# Patient Record
Sex: FEMALE | Race: WHITE | NOT HISPANIC OR LATINO | URBAN - METROPOLITAN AREA
[De-identification: names, ages, dates, MRNs, and addresses within clinical notes are randomized per-mention and may not be internally consistent; named-entity substitution may affect disease eponyms.]

---

## 2018-09-24 ENCOUNTER — EMERGENCY (EMERGENCY)
Facility: HOSPITAL | Age: 28
LOS: 0 days | Discharge: HOME | End: 2018-09-24
Admitting: PHYSICIAN ASSISTANT

## 2018-09-24 VITALS — WEIGHT: 149.91 LBS | HEIGHT: 64 IN

## 2018-09-24 VITALS
OXYGEN SATURATION: 100 % | RESPIRATION RATE: 18 BRPM | TEMPERATURE: 97 F | HEART RATE: 96 BPM | SYSTOLIC BLOOD PRESSURE: 135 MMHG | DIASTOLIC BLOOD PRESSURE: 50 MMHG

## 2018-09-24 DIAGNOSIS — M79.673 PAIN IN UNSPECIFIED FOOT: ICD-10-CM

## 2018-09-24 DIAGNOSIS — M79.672 PAIN IN LEFT FOOT: ICD-10-CM

## 2018-09-24 DIAGNOSIS — J45.909 UNSPECIFIED ASTHMA, UNCOMPLICATED: ICD-10-CM

## 2018-09-24 DIAGNOSIS — M79.675 PAIN IN LEFT TOE(S): ICD-10-CM

## 2018-09-24 RX ORDER — IBUPROFEN 200 MG
600 TABLET ORAL ONCE
Qty: 0 | Refills: 0 | Status: COMPLETED | OUTPATIENT
Start: 2018-09-24 | End: 2018-09-24

## 2018-09-24 RX ADMIN — Medication 600 MILLIGRAM(S): at 23:35

## 2018-09-24 NOTE — ED PROVIDER NOTE - NS ED ROS FT
GEN: (-) fever, (-) chills  NEURO: (-) weakness, (-) paresthesias  MS:(+) Big toe pain, (-)myalgias, (-) swelling  SKIN: (-) rashes, (-) new lesions, (-) ecchymosis

## 2018-09-24 NOTE — ED PROVIDER NOTE - OBJECTIVE STATEMENT
The patient is a 28y Female presenting to the ED with left big toe pain x 2 days. Patient states she started developing sharp pain at the base of her big toe that is worse with walking or any type of pressure. Patient denies any trauma, swelling, fever/chills, or numbness/tingling. Patient has not taken any pain medications for this. She The patient is a 28y Female presenting to the ED with left big toe pain x 2 days. Patient states she started developing sharp pain at the base of her big toe that is worse with walking or any type of pressure. Patient denies any trauma, swelling, fever/chills, or numbness/tingling. Patient has not taken any pain medications for this.

## 2018-09-24 NOTE — ED PROVIDER NOTE - MEDICAL DECISION MAKING DETAILS
Patient here for left foot and left great toe pain, no known trauma or injury. xray unremarkable, will ace wrap, hard sole shoe and refer to podiatry

## 2018-09-24 NOTE — ED PROVIDER NOTE - PROGRESS NOTE DETAILS
FANY Noel: I was directly involved in the care and management of this patient while supervising PA Fellow

## 2019-07-18 ENCOUNTER — OUTPATIENT (OUTPATIENT)
Dept: OUTPATIENT SERVICES | Facility: HOSPITAL | Age: 29
LOS: 1 days | Discharge: HOME | End: 2019-07-18

## 2019-07-19 DIAGNOSIS — Z00.01 ENCOUNTER FOR GENERAL ADULT MEDICAL EXAMINATION WITH ABNORMAL FINDINGS: ICD-10-CM

## 2019-07-19 PROBLEM — J45.909 UNSPECIFIED ASTHMA, UNCOMPLICATED: Chronic | Status: ACTIVE | Noted: 2018-09-25

## 2019-07-24 ENCOUNTER — OUTPATIENT (OUTPATIENT)
Dept: OUTPATIENT SERVICES | Facility: HOSPITAL | Age: 29
LOS: 1 days | Discharge: HOME | End: 2019-07-24

## 2019-07-25 DIAGNOSIS — R79.9 ABNORMAL FINDING OF BLOOD CHEMISTRY, UNSPECIFIED: ICD-10-CM

## 2020-03-15 ENCOUNTER — EMERGENCY (EMERGENCY)
Facility: HOSPITAL | Age: 30
LOS: 0 days | Discharge: HOME | End: 2020-03-15
Attending: EMERGENCY MEDICINE | Admitting: EMERGENCY MEDICINE
Payer: MEDICAID

## 2020-03-15 VITALS
TEMPERATURE: 97 F | OXYGEN SATURATION: 98 % | DIASTOLIC BLOOD PRESSURE: 73 MMHG | SYSTOLIC BLOOD PRESSURE: 130 MMHG | HEART RATE: 86 BPM

## 2020-03-15 VITALS
TEMPERATURE: 97 F | HEART RATE: 85 BPM | RESPIRATION RATE: 16 BRPM | SYSTOLIC BLOOD PRESSURE: 131 MMHG | DIASTOLIC BLOOD PRESSURE: 58 MMHG | OXYGEN SATURATION: 100 %

## 2020-03-15 DIAGNOSIS — R11.2 NAUSEA WITH VOMITING, UNSPECIFIED: ICD-10-CM

## 2020-03-15 DIAGNOSIS — R11.10 VOMITING, UNSPECIFIED: ICD-10-CM

## 2020-03-15 LAB
ALBUMIN SERPL ELPH-MCNC: 4.4 G/DL — SIGNIFICANT CHANGE UP (ref 3.5–5.2)
ALP SERPL-CCNC: 99 U/L — SIGNIFICANT CHANGE UP (ref 30–115)
ALT FLD-CCNC: 14 U/L — SIGNIFICANT CHANGE UP (ref 0–41)
ANION GAP SERPL CALC-SCNC: 13 MMOL/L — SIGNIFICANT CHANGE UP (ref 7–14)
APPEARANCE UR: CLEAR — SIGNIFICANT CHANGE UP
AST SERPL-CCNC: 23 U/L — SIGNIFICANT CHANGE UP (ref 0–41)
BACTERIA # UR AUTO: ABNORMAL
BASOPHILS # BLD AUTO: 0.04 K/UL — SIGNIFICANT CHANGE UP (ref 0–0.2)
BASOPHILS NFR BLD AUTO: 0.3 % — SIGNIFICANT CHANGE UP (ref 0–1)
BILIRUB DIRECT SERPL-MCNC: <0.2 MG/DL — SIGNIFICANT CHANGE UP (ref 0–0.2)
BILIRUB INDIRECT FLD-MCNC: >0 MG/DL — LOW (ref 0.2–1.2)
BILIRUB SERPL-MCNC: 0.2 MG/DL — SIGNIFICANT CHANGE UP (ref 0.2–1.2)
BILIRUB UR-MCNC: NEGATIVE — SIGNIFICANT CHANGE UP
BUN SERPL-MCNC: 9 MG/DL — LOW (ref 10–20)
CALCIUM SERPL-MCNC: 9.2 MG/DL — SIGNIFICANT CHANGE UP (ref 8.5–10.1)
CHLORIDE SERPL-SCNC: 105 MMOL/L — SIGNIFICANT CHANGE UP (ref 98–110)
CO2 SERPL-SCNC: 21 MMOL/L — SIGNIFICANT CHANGE UP (ref 17–32)
COLOR SPEC: YELLOW — SIGNIFICANT CHANGE UP
CREAT SERPL-MCNC: 0.8 MG/DL — SIGNIFICANT CHANGE UP (ref 0.7–1.5)
DIFF PNL FLD: SIGNIFICANT CHANGE UP
EOSINOPHIL # BLD AUTO: 0.63 K/UL — SIGNIFICANT CHANGE UP (ref 0–0.7)
EOSINOPHIL NFR BLD AUTO: 5.4 % — SIGNIFICANT CHANGE UP (ref 0–8)
EPI CELLS # UR: 8 /HPF — HIGH (ref 0–5)
GLUCOSE SERPL-MCNC: 117 MG/DL — HIGH (ref 70–99)
GLUCOSE UR QL: NEGATIVE — SIGNIFICANT CHANGE UP
HCG SERPL QL: NEGATIVE — SIGNIFICANT CHANGE UP
HCT VFR BLD CALC: 42.5 % — SIGNIFICANT CHANGE UP (ref 37–47)
HGB BLD-MCNC: 13.7 G/DL — SIGNIFICANT CHANGE UP (ref 12–16)
HYALINE CASTS # UR AUTO: 1 /LPF — SIGNIFICANT CHANGE UP (ref 0–7)
IMM GRANULOCYTES NFR BLD AUTO: 0.4 % — HIGH (ref 0.1–0.3)
KETONES UR-MCNC: NEGATIVE — SIGNIFICANT CHANGE UP
LACTATE SERPL-SCNC: 1.6 MMOL/L — SIGNIFICANT CHANGE UP (ref 0.7–2)
LEUKOCYTE ESTERASE UR-ACNC: ABNORMAL
LIDOCAIN IGE QN: 21 U/L — SIGNIFICANT CHANGE UP (ref 7–60)
LYMPHOCYTES # BLD AUTO: 1.83 K/UL — SIGNIFICANT CHANGE UP (ref 1.2–3.4)
LYMPHOCYTES # BLD AUTO: 15.8 % — LOW (ref 20.5–51.1)
MCHC RBC-ENTMCNC: 27.7 PG — SIGNIFICANT CHANGE UP (ref 27–31)
MCHC RBC-ENTMCNC: 32.2 G/DL — SIGNIFICANT CHANGE UP (ref 32–37)
MCV RBC AUTO: 86 FL — SIGNIFICANT CHANGE UP (ref 81–99)
MONOCYTES # BLD AUTO: 0.63 K/UL — HIGH (ref 0.1–0.6)
MONOCYTES NFR BLD AUTO: 5.4 % — SIGNIFICANT CHANGE UP (ref 1.7–9.3)
NEUTROPHILS # BLD AUTO: 8.43 K/UL — HIGH (ref 1.4–6.5)
NEUTROPHILS NFR BLD AUTO: 72.7 % — SIGNIFICANT CHANGE UP (ref 42.2–75.2)
NITRITE UR-MCNC: NEGATIVE — SIGNIFICANT CHANGE UP
NRBC # BLD: 0 /100 WBCS — SIGNIFICANT CHANGE UP (ref 0–0)
PH UR: 6 — SIGNIFICANT CHANGE UP (ref 5–8)
PLATELET # BLD AUTO: 332 K/UL — SIGNIFICANT CHANGE UP (ref 130–400)
POTASSIUM SERPL-MCNC: 4.6 MMOL/L — SIGNIFICANT CHANGE UP (ref 3.5–5)
POTASSIUM SERPL-SCNC: 4.6 MMOL/L — SIGNIFICANT CHANGE UP (ref 3.5–5)
PROT SERPL-MCNC: 7.3 G/DL — SIGNIFICANT CHANGE UP (ref 6–8)
PROT UR-MCNC: SIGNIFICANT CHANGE UP
RBC # BLD: 4.94 M/UL — SIGNIFICANT CHANGE UP (ref 4.2–5.4)
RBC # FLD: 13.3 % — SIGNIFICANT CHANGE UP (ref 11.5–14.5)
RBC CASTS # UR COMP ASSIST: 5 /HPF — HIGH (ref 0–4)
SODIUM SERPL-SCNC: 139 MMOL/L — SIGNIFICANT CHANGE UP (ref 135–146)
SP GR SPEC: 1.03 — HIGH (ref 1.01–1.02)
TROPONIN T SERPL-MCNC: <0.01 NG/ML — SIGNIFICANT CHANGE UP
UROBILINOGEN FLD QL: SIGNIFICANT CHANGE UP
WBC # BLD: 11.61 K/UL — HIGH (ref 4.8–10.8)
WBC # FLD AUTO: 11.61 K/UL — HIGH (ref 4.8–10.8)
WBC UR QL: 8 /HPF — HIGH (ref 0–5)

## 2020-03-15 PROCEDURE — 71045 X-RAY EXAM CHEST 1 VIEW: CPT | Mod: 26

## 2020-03-15 PROCEDURE — 93010 ELECTROCARDIOGRAM REPORT: CPT

## 2020-03-15 PROCEDURE — 99285 EMERGENCY DEPT VISIT HI MDM: CPT

## 2020-03-15 RX ORDER — SODIUM CHLORIDE 9 MG/ML
1000 INJECTION, SOLUTION INTRAVENOUS ONCE
Refills: 0 | Status: COMPLETED | OUTPATIENT
Start: 2020-03-15 | End: 2020-03-15

## 2020-03-15 RX ORDER — SODIUM CHLORIDE 9 MG/ML
1000 INJECTION INTRAMUSCULAR; INTRAVENOUS; SUBCUTANEOUS ONCE
Refills: 0 | Status: COMPLETED | OUTPATIENT
Start: 2020-03-15 | End: 2020-03-15

## 2020-03-15 RX ORDER — METOCLOPRAMIDE HCL 10 MG
10 TABLET ORAL ONCE
Refills: 0 | Status: COMPLETED | OUTPATIENT
Start: 2020-03-15 | End: 2020-03-15

## 2020-03-15 RX ADMIN — Medication 10 MILLIGRAM(S): at 06:05

## 2020-03-15 RX ADMIN — SODIUM CHLORIDE 1000 MILLILITER(S): 9 INJECTION, SOLUTION INTRAVENOUS at 07:25

## 2020-03-15 RX ADMIN — SODIUM CHLORIDE 1000 MILLILITER(S): 9 INJECTION INTRAMUSCULAR; INTRAVENOUS; SUBCUTANEOUS at 06:05

## 2020-03-15 NOTE — ED PROVIDER NOTE - CARE PLAN
Assessment and plan of treatment:	Plan: EKG, labs, ivf, u preg, urine, anti-emetic, CXR, reassess. Principal Discharge DX:	Vomiting  Assessment and plan of treatment:	Plan: EKG, labs, ivf, u preg, urine, anti-emetic, CXR, reassess.

## 2020-03-15 NOTE — ED PROVIDER NOTE - PROGRESS NOTE DETAILS
TC: Pt signed out to me by Dr. Bradley. 28 yo F with no PMHx who presents with nausea. No abd pain, dysuria, urinary frequency/urgency, hematuria. Given IVF, reglan. Labs wnl, serum preg negative. Pt tolerated po in ED. Does not want to wait for urine results and has no urinary sx. States she will f/u with PMD. Strict ED return precautions given. Pt verbalized understanding and was agreeable with plan. ADDENDUM by Brittany Landaverde M.D.: I received sign-off from Dr. PARTHA Zimmerman. 29yoF prev healthy p/w NBNB n/v since 330am likely 2/2 pizza, I was to reassess. At this time pt stating she feels all better and would like to leave, confirms hx, denies abd pain, BM difficulty, urinary symptoms. Labs unremarkable. NAD, well appearing, abd soft NT ND nml BS. Well hydrated. Patient is well appearing, NAD, afebrile, hemodynamically stable. Discharged with instructions in further symptomatic care, return precautions, and need for PMD f/u.

## 2020-03-15 NOTE — ED PROVIDER NOTE - NS ED ROS FT
Review of Systems:  •	CONSTITUTIONAL - No fever, No diaphoresis, No weight change  •	SKIN - No rash  •	HEMATOLOGIC - No abnormal bleeding or bruising  •	EYES - No eye pain, No blurred vision  •	ENT - No change in hearing, No sore throat, No neck pain, No rhinorrhea, No ear pain  •	RESPIRATORY - No shortness of breath, No cough  •	CARDIAC -No chest pain, No palpitations  •	GI - No abdominal pain, + nausea, + vomiting, No diarrhea, No constipation, No bright red blood per rectum or melena. No flank pain  •             - No dysuria, frequency, hematuria.   •	ENDO - No polydypsia, No polyuria, No heat/cold intolerance  •	MUSCULOSKELETAL - No joint paint, No swelling, No back pain  •	NEUROLOGIC - No numbness, No focal weakness, No headache, No dizziness  All other systems negative, unless specified in HPI

## 2020-03-15 NOTE — ED ADULT NURSE NOTE - NS_SISCREENINGSR_GEN_ALL_ED
Prior Authorization Retail Medication Request    Medication/Dose: Fish Oil  ICD code (if different than what is on RX):  See chart  Previously Tried and Failed:  See chart  Rationale:  See chart    Insurance Name:  See chart  Insurance ID:  [id number]      Pharmacy Information (if different than what is on RX)  Name: [facility]  Phone:  [phone]   Negative

## 2020-03-15 NOTE — ED ADULT NURSE NOTE - OBJECTIVE STATEMENT
Pt awake and alert, calm and comfortable. Pt presents with vomiting pink tinged sputum since yesterday x 5 episodes, Denies any fevers or dysuria. Pt reports having olives an pizza today. Pt afebrile 97F oral. Pt denies any dizziness/ lightheadedness, diarrhea or abdominal pain. HR 86 and regular. Abdominal nontender, and soft.

## 2020-03-15 NOTE — ED PROVIDER NOTE - PATIENT PORTAL LINK FT
You can access the FollowMyHealth Patient Portal offered by [facility] by registering at the following website: [email or web address]. By joining [employer]’s FollowMyHealth portal, you will also be able to view your health information using other applications (apps) compatible with our system.

## 2020-03-15 NOTE — ED PROVIDER NOTE - ATTENDING CONTRIBUTION TO CARE
28 y/o f/ w pmhx of asthma, lmp last week, presents with vomiting since 3:30 PM, nbnb, ~ 5 episodes, ate olives from a store, and reports pizza made at home, so vomit with pink form the pizza sauce. No fever, chills,  cp,  pleuritic cp, sob, palpitations, diaphoresis, cough, ha/lh/dizziness, numbness/tingling, neck pain/ stiffness, abd pain, diarrhea, constipation, melena/brbpr, urinary symptoms, trauma, weakness, edema, calf pain/swelling/erythema, sick contacts, recent travel or rash. no drug use, no etoh abuse, not a smoker.     Vital Signs: I have reviewed the initial vital signs. Constitutional: WDWN in nad. Sitting on stretcher speaking full sentences. Integumentary: No rash. ENT: MMM NECK: Supple, non-tender, no meningeal signs. Cardiovascular: RRR, radial pulses 2/4 b/l. No JVD. Respiratory: BS present b/l, ctabl, no wheezing or crackles, no accessory muscle use, no stridor. Gastrointestinal: BS present throughout all 4 quadrants, soft, nd, nt, no rebound tenderness or guarding, no cvat. Musculoskeletal: FROM, no edema, no calf pain/swelling/erythema. Neurologic: AAOx3, motor 5/5 and sensation intact throughout upper and lower ext, CN II-XII intact, No facial droop or slurring of speech. No focal deficits.

## 2020-03-15 NOTE — ED ADULT TRIAGE NOTE - CHIEF COMPLAINT QUOTE
Pt reports nausea and vomiting tonight with x5 episodes with light pink color. Denies fever, abd pain, chest pain, sob, and diarrhea.

## 2020-03-15 NOTE — ED PROVIDER NOTE - OBJECTIVE STATEMENT
28 y/o female with no PMH who presents to ED for nausea and vomiting. Pt states she had onset of NBNB vomiting this evening. No abdominal pain, no diarrhea, constipation, fever, chills, ill contacts. No hx of abd surgery.

## 2020-03-15 NOTE — ED PROVIDER NOTE - CLINICAL SUMMARY MEDICAL DECISION MAKING FREE TEXT BOX
Abd benign. Well hydrated after fluids. Patient is well appearing, NAD, afebrile, hemodynamically stable. Discharged with instructions in further symptomatic care, return precautions, and need for PMD f/u.

## 2020-03-15 NOTE — ED ADULT NURSE REASSESSMENT NOTE - NS ED NURSE REASSESS COMMENT FT1
pt assessed A&ox4, VSS, pt states she feels much better denies any nausea or vomiting since arriving MD sheehan aware. safety and comfort measures maintained. will continue to monitor

## 2020-03-16 LAB
CULTURE RESULTS: SIGNIFICANT CHANGE UP
SPECIMEN SOURCE: SIGNIFICANT CHANGE UP

## 2020-09-03 NOTE — ED ADULT NURSE NOTE - GENITOURINARY ASSESSMENT
well developed, well nourished , in no acute distress , ambulating without difficulty , normal communication ability
- - -

## 2021-09-23 ENCOUNTER — EMERGENCY (EMERGENCY)
Facility: HOSPITAL | Age: 31
LOS: 0 days | Discharge: HOME | End: 2021-09-23
Attending: EMERGENCY MEDICINE | Admitting: EMERGENCY MEDICINE
Payer: MEDICAID

## 2021-09-23 VITALS
HEART RATE: 113 BPM | SYSTOLIC BLOOD PRESSURE: 131 MMHG | HEIGHT: 64 IN | OXYGEN SATURATION: 97 % | WEIGHT: 199.96 LBS | TEMPERATURE: 99 F | DIASTOLIC BLOOD PRESSURE: 78 MMHG | RESPIRATION RATE: 22 BRPM

## 2021-09-23 VITALS — HEART RATE: 95 BPM

## 2021-09-23 DIAGNOSIS — Z76.0 ENCOUNTER FOR ISSUE OF REPEAT PRESCRIPTION: ICD-10-CM

## 2021-09-23 DIAGNOSIS — J45.901 UNSPECIFIED ASTHMA WITH (ACUTE) EXACERBATION: ICD-10-CM

## 2021-09-23 PROCEDURE — 99284 EMERGENCY DEPT VISIT MOD MDM: CPT

## 2021-09-23 RX ORDER — IPRATROPIUM/ALBUTEROL SULFATE 18-103MCG
3 AEROSOL WITH ADAPTER (GRAM) INHALATION ONCE
Refills: 0 | Status: COMPLETED | OUTPATIENT
Start: 2021-09-23 | End: 2021-09-23

## 2021-09-23 RX ORDER — ALBUTEROL 90 UG/1
1 AEROSOL, METERED ORAL ONCE
Refills: 0 | Status: COMPLETED | OUTPATIENT
Start: 2021-09-23 | End: 2021-09-23

## 2021-09-23 RX ADMIN — Medication 3 MILLILITER(S): at 19:15

## 2021-09-23 RX ADMIN — Medication 60 MILLIGRAM(S): at 19:47

## 2021-09-23 RX ADMIN — ALBUTEROL 1 PUFF(S): 90 AEROSOL, METERED ORAL at 19:48

## 2021-09-23 NOTE — ED PROVIDER NOTE - PHYSICAL EXAMINATION
VITAL SIGNS: I have reviewed nursing notes and confirm.  CONSTITUTIONAL: calm female in stretcher, non-toxic, NAD  SKIN: Warm dry, normal skin turgor  HEAD: NCAT  EYES: EOMI, no scleral icterus  ENT: Moist mucous membranes, normal pharynx with no erythema or exudates  NECK: Supple; non tender. Full ROM.   CARD: RRR, no murmurs, rubs or gallops  RESP: mild bilateral wheezing.  ABD: soft, non-tender, non-distended, no rebound or guarding.   EXT: Full ROM, no bony tenderness, no pedal edema, no calf tenderness  NEURO: alert and oriented x 3, normal motor/sensory.  PSYCH: Cooperative, appropriate.

## 2021-09-23 NOTE — ED PROVIDER NOTE - PROGRESS NOTE DETAILS
Raz: pt initial peak flow 280 Raz: pt currently on third duoneb treatment, reports shortness of breath improved. lungs clear bilaterally. Raz: Pt given albuterol inhaler in ED. States she will call her PMD tomorrow morning for rx for machine. Feels comfortable for dc. Full DC instructions discussed and patient knows when to seek immediate medical attention.  Patient has proper follow up. All questions and concerns from patient addressed. Understanding of instructions verbalized.

## 2021-09-23 NOTE — ED ADULT NURSE NOTE - OBJECTIVE STATEMENT
30 y/o female pt came c/o asthma exacerbation, stated she ran out of her nebulizers and albuterol inhaler is not working. Pt is A&O x 4, denies any pain, b/l wheezes and diminished, received few duo nebs from EMS with slight improvement, denies cough, fever or body aches.

## 2021-09-23 NOTE — ED ADULT NURSE NOTE - NSIMPLEMENTINTERV_GEN_ALL_ED
Implemented All Universal Safety Interventions:  [place] to call system. Call bell, personal items and telephone within reach. Instruct patient to call for assistance. Room bathroom lighting operational. Non-slip footwear when patient is off stretcher. Physically safe environment: no spills, clutter or unnecessary equipment. Stretcher in lowest position, wheels locked, appropriate side rails in place.

## 2021-09-23 NOTE — ED ADULT TRIAGE NOTE - CHIEF COMPLAINT QUOTE
BIBA from home for asthma exacerbation   pt did not have any more nebulizer tx at home  EMS gave 3 duonebs en route  pt with b/l expiratory wheezes

## 2021-09-23 NOTE — ED PROVIDER NOTE - NS ED ROS FT
Constitutional: (-) diaphoresis  Eyes/ENT: (-) runny nose  Cardiovascular: (-) chest pain  Respiratory: see HPI  Gastrointestinal: (-) vomiting, (-) diarrhea  : (-) dysuria   Musculoskeletal: (-) joint pain  Integumentary: (-) rash  Neurological: (-) LOC  Allergic/Immunologic: (-) pruritus

## 2021-09-23 NOTE — ED PROVIDER NOTE - NSFOLLOWUPINSTRUCTIONS_ED_ALL_ED_FT
Call your primary care doctor tomorrow to request a refill for your asthma medications. Follow up with your primary care doctor.    Asthma    Asthma is a condition in which the airways tighten and narrow, making it difficult to breath. Asthma episodes, also called asthma attacks, range from minor to life-threatening. Symptoms include wheezing, coughing, chest tightness, or shortness of breath. The diagnosis of asthma is made by a review of your medical history and a physical exam, but may involve additional testing. Asthma cannot be cured, but medicines and lifestyle changes can help control it. Avoid triggers of asthma which may include animal dander, pollen, mold, smoke, air pollutants, etc.     SEEK IMMEDIATE MEDICAL CARE IF YOU HAVE ANY OF THE FOLLOWING SYMPTOMS: worsening of symptoms, shortness of breath at rest, chest pain, bluish discoloration to lips or fingertips, lightheadedness/dizziness, or fever. Take Prednisone 40mg (2 tablets) once a day for 4 days. This prescription has been sent to your pharmacy [facility] on [facility].  Call your primary care doctor tomorrow to request a refill for your asthma medications.  Follow up with your primary care doctor.    Asthma    Asthma is a condition in which the airways tighten and narrow, making it difficult to breath. Asthma episodes, also called asthma attacks, range from minor to life-threatening. Symptoms include wheezing, coughing, chest tightness, or shortness of breath. The diagnosis of asthma is made by a review of your medical history and a physical exam, but may involve additional testing. Asthma cannot be cured, but medicines and lifestyle changes can help control it. Avoid triggers of asthma which may include animal dander, pollen, mold, smoke, air pollutants, etc.     SEEK IMMEDIATE MEDICAL CARE IF YOU HAVE ANY OF THE FOLLOWING SYMPTOMS: worsening of symptoms, shortness of breath at rest, chest pain, bluish discoloration to lips or fingertips, lightheadedness/dizziness, or fever.

## 2021-09-23 NOTE — ED PROVIDER NOTE - PATIENT PORTAL LINK FT
You can access the FollowMyHealth Patient Portal offered by [facility] by registering at the following website: [email or web address]. By joining [employer]’s FollowMyHealth portal, you will also be able to view your health information using other applications (apps) compatible with our system.

## 2021-09-23 NOTE — ED PROVIDER NOTE - ATTENDING CONTRIBUTION TO CARE
31 F hx of mild intermittent asthma, never admitted. now machine stopped working. had asthma exacerbation, weather related. no cough. no fever. + immunized.  vss, nontoxic, well appearing, pink conj, anicteric, MMM, no exudates, neck supple, + wheezing mild end exp, RRR, equal radial pulses bilat, abd soft/nt/nd, no cva tend. no calves tend, no edema, no fnd. no rashes.

## 2021-09-23 NOTE — ED PROVIDER NOTE - OBJECTIVE STATEMENT
31F PMHx asthma (no hx hospital admissions) presents for asthma exacerbations. Reports her home machine stopped working today so she was unable to use her home meds. Uses singulair and albuterol at home. Denies fever/chills, cough, chest pain, abd pain, n/v/d. Vaccinated for COVID. No sick contacts. Came to ED requesting med/machine refill.

## 2022-01-13 ENCOUNTER — EMERGENCY (EMERGENCY)
Facility: HOSPITAL | Age: 32
LOS: 0 days | Discharge: HOME | End: 2022-01-13
Attending: EMERGENCY MEDICINE | Admitting: EMERGENCY MEDICINE
Payer: MEDICAID

## 2022-01-13 VITALS
OXYGEN SATURATION: 94 % | DIASTOLIC BLOOD PRESSURE: 74 MMHG | RESPIRATION RATE: 18 BRPM | SYSTOLIC BLOOD PRESSURE: 110 MMHG | TEMPERATURE: 98 F | WEIGHT: 190.04 LBS | HEIGHT: 64 IN | HEART RATE: 108 BPM

## 2022-01-13 DIAGNOSIS — J45.901 UNSPECIFIED ASTHMA WITH (ACUTE) EXACERBATION: ICD-10-CM

## 2022-01-13 PROCEDURE — 99284 EMERGENCY DEPT VISIT MOD MDM: CPT

## 2022-01-13 RX ORDER — DEXAMETHASONE 0.5 MG/5ML
10 ELIXIR ORAL ONCE
Refills: 0 | Status: COMPLETED | OUTPATIENT
Start: 2022-01-13 | End: 2022-01-13

## 2022-01-13 RX ORDER — ALBUTEROL 90 UG/1
3 AEROSOL, METERED ORAL
Qty: 60 | Refills: 0
Start: 2022-01-13 | End: 2022-02-23

## 2022-01-13 RX ORDER — DEXAMETHASONE 0.5 MG/5ML
10 ELIXIR ORAL ONCE
Refills: 0 | Status: DISCONTINUED | OUTPATIENT
Start: 2022-01-13 | End: 2022-01-13

## 2022-01-13 RX ORDER — IPRATROPIUM/ALBUTEROL SULFATE 18-103MCG
3 AEROSOL WITH ADAPTER (GRAM) INHALATION
Refills: 0 | Status: DISCONTINUED | OUTPATIENT
Start: 2022-01-13 | End: 2022-01-13

## 2022-01-13 RX ORDER — ALBUTEROL 90 UG/1
3 AEROSOL, METERED ORAL
Qty: 60 | Refills: 0
Start: 2022-01-13 | End: 2022-01-17

## 2022-01-13 RX ORDER — IPRATROPIUM/ALBUTEROL SULFATE 18-103MCG
3 AEROSOL WITH ADAPTER (GRAM) INHALATION ONCE
Refills: 0 | Status: COMPLETED | OUTPATIENT
Start: 2022-01-13 | End: 2022-01-13

## 2022-01-13 RX ADMIN — Medication 3 MILLILITER(S): at 02:32

## 2022-01-13 RX ADMIN — Medication 3 MILLILITER(S): at 02:33

## 2022-01-13 RX ADMIN — Medication 10 MILLIGRAM(S): at 02:32

## 2022-01-13 NOTE — ED PROVIDER NOTE - PROGRESS NOTE DETAILS
Raz: lungs ctab s/p 3 duonebs.  pt states she will f/u with her pulmonologist.  Full DC instructions discussed and patient knows when to seek immediate medical attention.  Patient has proper follow up.  All results discussed and patient aware they may require further work up.  Proper follow up ensured. Limitations of ED work up discussed.  Medications administered and prescribed/OTC home meds discussed.  All questions and concerns from patient or family addressed. Understanding of instructions verbalized.

## 2022-01-13 NOTE — ED ADULT NURSE NOTE - CHIEF COMPLAINT QUOTE
BIBA from home with complaints of asthma exacerbation. Patient states she took home medications without improvement and EMs gave patient neb treatment with slight improvement.

## 2022-01-13 NOTE — ED PROVIDER NOTE - OBJECTIVE STATEMENT
31F PMHx asthma (never been admitted/intubated) presents for asthma exacerbation. Pt used her albuterol inhaler twice pta and received 1 treatment with EMS with some relief. No fever/chills, cough, congestion, rhinorrhea, chest pain, abd pain, n/v/d.

## 2022-01-13 NOTE — ED PROVIDER NOTE - CARE PROVIDER_API CALL
Trenton Cadena)  Critical Care Medicine; Pulmonary Disease; Sleep Medicine  [address] [place] [address]  Phone: [phone]  Fax: [phone]  Follow Up Time:

## 2022-01-13 NOTE — ED PROVIDER NOTE - NSFOLLOWUPINSTRUCTIONS_ED_ALL_ED_FT
FOLLOW UP WITH A PULMONOLOGIST AND YOUR PRIMARY CARE DOCTOR.  A PRESCRIPTION FOR ALBUTEROL WAS SENT TO YOUR PHARMACY.    Asthma    Asthma is a condition in which the airways tighten and narrow, making it difficult to breath. Asthma episodes, also called asthma attacks, range from minor to life-threatening. Symptoms include wheezing, coughing, chest tightness, or shortness of breath. The diagnosis of asthma is made by a review of your medical history and a physical exam, but may involve additional testing. Asthma cannot be cured, but medicines and lifestyle changes can help control it. Avoid triggers of asthma which may include animal dander, pollen, mold, smoke, air pollutants, etc.     SEEK IMMEDIATE MEDICAL CARE IF YOU HAVE ANY OF THE FOLLOWING SYMPTOMS: worsening of symptoms, shortness of breath at rest, chest pain, bluish discoloration to lips or fingertips, lightheadedness/dizziness, or fever.

## 2022-01-13 NOTE — ED PROVIDER NOTE - PHYSICAL EXAMINATION
VITAL SIGNS: I have reviewed nursing notes and confirm.  CONSTITUTIONAL: well-appearing female, non-toxic, NAD  SKIN: Warm dry, normal skin turgor  HEAD: NCAT  EYES: EOMI, no scleral icterus  ENT: Moist mucous membranes, normal pharynx with no erythema or exudates  NECK: Supple; non tender. Full ROM.   CARD: RRR, no murmurs, rubs or gallops  RESP: +bl wheezing L >R. not tachypneic, speaking in full sentences.  ABD: soft, non-tender, non-distended, no rebound or guarding.   EXT: Full ROM, no bony tenderness, no pedal edema, no calf tenderness  NEURO: Normal gait.  PSYCH: Cooperative, appropriate.  \

## 2022-01-13 NOTE — ED ADULT TRIAGE NOTE - CHIEF COMPLAINT QUOTE
Problem: Chemotherapy Effects (Adult)  Goal: Signs and Symptoms of Listed Potential Problems Will be Absent, Minimized or Managed (Chemotherapy Effects)  Signs and symptoms of listed potential problems will be absent, minimized or managed by discharge/transition of care (reference Chemotherapy Effects (Adult) CPG).   Outcome: Ongoing (interventions implemented as appropriate)  Pt here for Keytruda infusion, accompanied by spouse, no new complaints or concerns at present, reports tolerating treatment well; discussed treatment plan for today, all questions answered and pt agrees to proceed       BIBA from home with complaints of asthma exacerbation. Patient states she took home medications without improvement and EMs gave patient neb treatment with slight improvement.

## 2022-01-13 NOTE — ED PROVIDER NOTE - PATIENT PORTAL LINK FT
You can access the FollowMyHealth Patient Portal offered by [facility] by registering at the following website: [email or web address]. By joining [employer]’s FollowMyHealth portal, you will also be able to view your health information using other applications (apps) compatible with our system.

## 2022-01-13 NOTE — ED PROVIDER NOTE - ATTENDING CONTRIBUTION TO CARE
I personally evaluated the patient. I reviewed the Resident’s or Physician Assistant’s note (as assigned above), and agree with the findings and plan except as documented in my note.  Pt with hx of asthma, no hx of intubations, presents with 2 days of chest tightness, wheezing and SOB for the past 2 days. Denies fever ,no sick contacts. VS reviewed, pt non-toxic appearing, NAD. Head ncat, MMM, neck supple, normal ROM, normal s1s2 without any murmurs, Lungs with b/l expiratory wheezes, abd +BS, s/nd/nt, extremities wnl, neuro exam grossly normal. No acute skin rashes. Plan is meds and reassess.

## 2022-01-31 NOTE — ED PROVIDER NOTE - ATTENDING WITH...
Leelee Mcclain DO      Electronically signed by Leelee Mcclain DO on 1/31/22 at 8:51 AM EST Resident

## 2022-02-19 ENCOUNTER — EMERGENCY (EMERGENCY)
Facility: HOSPITAL | Age: 32
LOS: 0 days | Discharge: HOME | End: 2022-02-19
Attending: EMERGENCY MEDICINE | Admitting: EMERGENCY MEDICINE
Payer: MEDICAID

## 2022-02-19 VITALS
WEIGHT: 179.9 LBS | TEMPERATURE: 98 F | DIASTOLIC BLOOD PRESSURE: 75 MMHG | RESPIRATION RATE: 20 BRPM | HEART RATE: 103 BPM | SYSTOLIC BLOOD PRESSURE: 123 MMHG | OXYGEN SATURATION: 94 % | HEIGHT: 64 IN

## 2022-02-19 DIAGNOSIS — R06.2 WHEEZING: ICD-10-CM

## 2022-02-19 DIAGNOSIS — J45.901 UNSPECIFIED ASTHMA WITH (ACUTE) EXACERBATION: ICD-10-CM

## 2022-02-19 PROCEDURE — 99284 EMERGENCY DEPT VISIT MOD MDM: CPT

## 2022-02-19 RX ORDER — IPRATROPIUM/ALBUTEROL SULFATE 18-103MCG
3 AEROSOL WITH ADAPTER (GRAM) INHALATION ONCE
Refills: 0 | Status: COMPLETED | OUTPATIENT
Start: 2022-02-19 | End: 2022-02-19

## 2022-02-19 RX ORDER — ALBUTEROL 90 UG/1
2 AEROSOL, METERED ORAL
Qty: 1 | Refills: 0
Start: 2022-02-19 | End: 2022-02-21

## 2022-02-19 RX ADMIN — Medication 50 MILLIGRAM(S): at 02:50

## 2022-02-19 RX ADMIN — Medication 3 MILLILITER(S): at 02:50

## 2022-02-19 NOTE — ED PROVIDER NOTE - ATTENDING CONTRIBUTION TO CARE
I personally evaluated the patient. I reviewed the Resident’s or Physician Assistant’s note (as assigned above), and agree with the findings and plan except as documented in my note.  31-year-old female with past medical history of asthma, non-smoker, no history of intubations no recent admissions presents with complaints of several hours of shortness of breath with associated wheezing.  Denies coughing, fever, chest pain, lightheadedness, LOC.  VSS, non toxic appearing, NAD, Head NCAT, MMM, neck supple, normal ROM, normal s1s2, lungs with mild expiratory wheezes, abd s/nt/nd, no guarding or rebound, extremities wnl, AAO x 3, GCS 15, neuro grossly normal. No acute skin lesions. Plan is nebs, steroids, and reassess.

## 2022-02-19 NOTE — ED PROVIDER NOTE - NSFOLLOWUPINSTRUCTIONS_ED_ALL_ED_FT
Asthma, Adult  Asthma is a long-term (chronic) condition that causes recurrent episodes in which the airways become tight and narrow. The airways are the passages that lead from the nose and mouth down into the lungs. Asthma episodes, also called asthma attacks, can cause coughing, wheezing, shortness of breath, and chest pain. The airways can also fill with mucus. During an attack, it can be difficult to breathe. Asthma attacks can range from minor to life threatening.    ImageAsthma cannot be cured, but medicines and lifestyle changes can help control it and treat acute attacks.    What are the causes?  This condition is believed to be caused by inherited (genetic) and environmental factors, but its exact cause is not known.    There are many things that can bring on an asthma attack or make asthma symptoms worse (triggers). Asthma triggers are different for each person. Common triggers include:    Mold.  Dust.  Cigarette smoke.  Cockroaches.  Things that can cause allergy symptoms (allergens), such as animal dander or pollen from trees or grass.  Air pollutants such as household , wood smoke, smog, or chemical odors.  Cold air, weather changes, and winds (which increase molds and pollen in the air).  Strong emotional expressions such as crying or laughing hard.  Stress.  Certain medicines (such as aspirin) or types of medicines (such as beta-blockers).  Sulfites in foods and drinks. Foods and drinks that may contain sulfites include dried fruit, potato chips, and sparkling grape juice.  Infections or inflammatory conditions such as the flu, a cold, or inflammation of the nasal membranes (rhinitis).  Gastroesophageal reflux disease (GERD).  Exercise or strenuous activity.    What are the signs or symptoms?  Symptoms of this condition may occur right after asthma is triggered or many hours later. Symptoms include:    Wheezing. This can sound like whistling when you breathe.  Excessive nighttime or early morning coughing.  Frequent or severe coughing with a common cold.  Chest tightness.  Shortness of breath.  Tiredness (fatigue) with minimal activity.    How is this diagnosed?  This condition is diagnosed based on:    Your medical history.  A physical exam.  Tests, which may include:    Lung function studies and pulmonary studies (spirometry). These tests can evaluate the flow of air in your lungs.  Allergy tests.  Imaging tests, such as X-rays.      How is this treated?  There is no cure for this condition, but treatment can help control your symptoms. Treatment for asthma usually involves:    Identifying and avoiding your asthma triggers.  Using medicines to control your symptoms. Generally, two types of medicines are used to treat asthma:    Controller medicines. These help prevent asthma symptoms from occurring. They are usually taken every day.  Fast-acting reliever or rescue medicines. These quickly relieve asthma symptoms by widening the narrow and tight airways. They are used as needed and provide short-term relief.    Using supplemental oxygen. This may be needed during a severe episode.  Using other medicines, such as:    Allergy medicines, such as antihistamines, if your asthma attacks are triggered by allergens.  Immune medicines (immunomodulators). These are medicines that help control the immune system.    Creating an asthma action plan. An asthma action plan is a written plan for managing and treating your asthma attacks. This plan includes:    A list of your asthma triggers and how to avoid them.  Information about when medicines should be taken and when their dosage should be changed.  Instructions about using a device called a peak flow meter. A peak flow meter measures how well the lungs are working and the severity of your asthma. It helps you monitor your condition.      Follow these instructions at home:  Controlling your home environment     Control your home environment in the following ways to help avoid triggers and prevent asthma attacks:    Change your heating and air conditioning filter regularly.  Limit your use of fireplaces and wood stoves.  Get rid of pests (such as roaches and mice) and their droppings.  Throw away plants if you see mold on them.  Clean floors and dust surfaces regularly. Use unscented cleaning products.  Try to have someone else vacuum for you regularly. Stay out of rooms while they are being vacuumed and for a short while afterward. If you vacuum, use a dust mask from a hardware store, a double-layered or microfilter vacuum  bag, or a vacuum  with a HEPA filter.  Replace carpet with wood, tile, or vinyl darwin. Carpet can trap dander and dust.  Use allergy-proof pillows, mattress covers, and box spring covers.  Keep your bedroom a trigger-free room.   Avoid pets and keep windows closed when allergens are in the air.  Wash beddings every week in hot water and dry them in a dryer.  Use blankets that are made of polyester or cotton.  Clean bathrooms and rakan with bleach. If possible, have someone repaint the walls in these rooms with mold-resistant paint. Stay out of the rooms that are being cleaned and painted.  Wash your hands often with soap and water. If soap and water are not available, use hand .  Do not allow anyone to smoke in your home.    General instructions     Take over-the-counter and prescription medicines only as told by your health care provider.    Speak with your health care provider if you have questions about how or when to take the medicines.  Make note if you are requiring more frequent dosages.    Do not use any products that contain nicotine or tobacco, such as cigarettes and e-cigarettes. If you need help quitting, ask your health care provider. Also, avoid being exposed to secondhand smoke.  Use a peak flow meter as told by your health care provider. Record and keep track of the readings.  Understand and use the asthma action plan to help minimize, or stop an asthma attack, without needing to seek medical care.  Make sure you stay up to date on your yearly vaccinations as told by your health care provider. This may include vaccines for the flu and pneumonia.  Avoid outdoor activities when allergen counts are high and when air quality is low.  Wear a ski mask that covers your nose and mouth during outdoor winter activities. Exercise indoors on cold days if you can.  Warm up before exercising, and take time for a cool-down period after exercise.  Keep all follow-up visits as told by your health care provider. This is important.  Where to find more information  For information about asthma, turn to the Centers for Disease Control and Prevention at www.cdc.gov/asthma/faqs.htm  For air quality information, turn to [employer] at https://airnow.gov/  Contact a health care provider if:  You have wheezing, shortness of breath, or a cough even while you are taking medicine to prevent attacks.  The mucus you cough up (sputum) is thicker than usual.  Your sputum changes from clear or white to yellow, green, gray, or bloody.  Your medicines are causing side effects, such as a rash, itching, swelling, or trouble breathing.  You need to use a reliever medicine more than 2–3 times a week.  Your peak flow reading is still at 50–79% of your personal best after following your action plan for 1 hour.  You have a fever.  Get help right away if:  You are getting worse and do not respond to treatment during an asthma attack.  You are short of breath when at rest or when doing very little physical activity.  You have difficulty eating, drinking, or talking.  You have chest pain or tightness.  You develop a fast heartbeat or palpitations.  You have a bluish color to your lips or fingernails.  You are light-headed or dizzy, or you faint.  Your peak flow reading is less than 50% of your personal best.  You feel too tired to breathe normally.  Summary  Asthma is a long-term (chronic) condition that causes recurrent episodes in which the airways become tight and narrow. These episodes can cause coughing, wheezing, shortness of breath, and chest pain.  Asthma cannot be cured, but medicines and lifestyle changes can help control it and treat acute attacks.  Make sure you understand how to avoid triggers and how and when to use your medicines.  Asthma attacks can range from minor to life threatening. Get help right away if you have an asthma attack and do not respond to treatment with your usual rescue medicines.  This information is not intended to replace advice given to you by your health care provider. Make sure you discuss any questions you have with your health care provider.

## 2022-02-19 NOTE — ED PROVIDER NOTE - OBJECTIVE STATEMENT
31 yold female to Ed pmhx asthma mild/moderate currently on albuterol mdi/nebs and singular; no oral steroids; pt c/o wheezing started last night with no specific trigger; pt denies fever, chills, cough, n/v, chest, back or abdominal pain;

## 2022-02-19 NOTE — ED PROVIDER NOTE - PATIENT PORTAL LINK FT
You can access the FollowMyHealth Patient Portal offered by [facility] by registering at the following website: [email or web address]. By joining [employer]’s FollowMyHealth portal, you will also be able to view your health information using other applications (apps) compatible with our system.

## 2022-02-19 NOTE — ED PROVIDER NOTE - NSFOLLOWUPCLINICS_GEN_ALL_ED_FT
[facility]  Medicine  [address], [place]   Phone: [phone]  Fax:   Follow Up Time: 1-3 Days

## 2022-02-19 NOTE — ED ADULT TRIAGE NOTE - CHIEF COMPLAINT QUOTE
BIBA with complaints shortness of breath/wheezing, asthma acting up BIBA with complaints shortness of breath/wheezing, asthma acting up today

## 2022-05-25 ENCOUNTER — EMERGENCY (EMERGENCY)
Facility: HOSPITAL | Age: 32
LOS: 0 days | Discharge: HOME | End: 2022-05-25
Attending: EMERGENCY MEDICINE | Admitting: EMERGENCY MEDICINE
Payer: MEDICAID

## 2022-05-25 VITALS
WEIGHT: 248.9 LBS | DIASTOLIC BLOOD PRESSURE: 73 MMHG | TEMPERATURE: 96 F | OXYGEN SATURATION: 93 % | HEART RATE: 103 BPM | RESPIRATION RATE: 24 BRPM | HEIGHT: 64 IN | SYSTOLIC BLOOD PRESSURE: 135 MMHG

## 2022-05-25 DIAGNOSIS — J45.901 UNSPECIFIED ASTHMA WITH (ACUTE) EXACERBATION: ICD-10-CM

## 2022-05-25 DIAGNOSIS — Z76.0 ENCOUNTER FOR ISSUE OF REPEAT PRESCRIPTION: ICD-10-CM

## 2022-05-25 PROCEDURE — 99284 EMERGENCY DEPT VISIT MOD MDM: CPT

## 2022-05-25 RX ORDER — ALBUTEROL 90 UG/1
3 AEROSOL, METERED ORAL
Qty: 84 | Refills: 0
Start: 2022-05-25 | End: 2022-05-31

## 2022-05-25 RX ORDER — IPRATROPIUM/ALBUTEROL SULFATE 18-103MCG
3 AEROSOL WITH ADAPTER (GRAM) INHALATION ONCE
Refills: 0 | Status: COMPLETED | OUTPATIENT
Start: 2022-05-25 | End: 2022-05-25

## 2022-05-25 RX ADMIN — Medication 3 MILLILITER(S): at 02:51

## 2022-05-25 RX ADMIN — Medication 60 MILLIGRAM(S): at 02:51

## 2022-05-25 NOTE — ED PROVIDER NOTE - PATIENT PORTAL LINK FT
You can access the FollowMyHealth Patient Portal offered by [facility] by registering at the following website: [email or web address]. By joining [employer]’s FollowMyHealth portal, you will also be able to view your health information using other applications (apps) compatible with our system.

## 2022-05-25 NOTE — ED PROVIDER NOTE - NS ED ATTENDING STATEMENT MOD
This was a shared visit with the KAILYN. I reviewed and verified the documentation and independently performed the documented:

## 2022-05-25 NOTE — ED PROVIDER NOTE - ATTENDING APP SHARED VISIT CONTRIBUTION OF CARE
31-year-old female history of asthma complaining of wheezing shortness of breath.  States ran out of her asthma meds at home.  No fever no chest pain.  No leg pain or swelling.  No admissions no ICU stays no intubations.  Has not been on steroids recently.    Patient no acute distress speaking full sentences ENT normal bilateral expiratory wheezing moving air well no accessory use is resting comfortably.  RRR.  Abdomen soft nontender.  No rash no edema no leg swelling.    Will give steroids and nebs reassess

## 2022-05-25 NOTE — ED PROVIDER NOTE - PHYSICAL EXAMINATION
Physical Exam    Constitutional: No acute distress.   Eyes: Conjunctiva pink, Sclera clear, PERRLA, EOMI.  ENT: No sinus tenderness. No nasal discharge. No oropharyngeal erythema, edema, or exudates. Uvula midline.   Cardiovascular: Regular rate, regular rhythm. No noted murmurs rubs or gallops.  Respiratory: unlabored respiratory effort, diffuse inspiratory wheeze.   Gastrointestinal: Normal bowel sounds. soft, non distended, non-tender abdomen.   Musculoskeletal: supple neck, no midline tenderness. No joint or bony deformity.   Integumentary: warm, dry, no rash  Neurologic: awake, alert, cranial nerves II-XII grossly intact, extremities’ motor and sensory functions grossly intact  Psychiatric: appropriate mood, appropriate affect

## 2022-05-25 NOTE — ED PROVIDER NOTE - OBJECTIVE STATEMENT
31 year old female with a past medical history of asthma, no prior admissions or intubations, presents to the ED with asthma exacerbation. Shortness of breath and wheezing started half an hour ago unrelieved by 4 pumps of her albuterol inhaler. She states that she is working on obtaining an appointment with her pulmonologist but has run out of her Singulair and steroid inhaler. Denies chest pain, cough, fever, chills, abdominal pain, nausea, vomiting.

## 2022-05-25 NOTE — ED PROVIDER NOTE - NSFOLLOWUPINSTRUCTIONS_ED_ALL_ED_FT
Please take prednisone 40mg for 5 days. Use Albuterol nebulizer every 6 hours as needed for acute flairs or albuterol pump. Follow up with your pulmonologist and continue all outpatient medications prescribed to you for asthma.     Asthma    Asthma is a recurring condition in which the airways tighten and narrow, making it difficult to breath. Asthma exacerbations, also called asthma attacks, range from minor to life-threatening. Symptoms include wheezing, coughing, chest tightness, or shortness of breath. The diagnosis of asthma is made by a review of your medical history and a physical exam, but may involve additional testing. Asthma cannot be cured, but medicines and lifestyle changes can help control it. Avoid triggers of asthma which may include animal dander, pollen, mold, smoke, air pollutants, etc.     SEEK IMMEDIATE MEDICAL CARE IF YOU HAVE THE FOLLOWING SYMPTOMS: worsening of symptoms, symptoms that do not respond to medication, shortness of breath at rest, chest pain, bluish discoloration to lips or fingertips, lightheadedness/dizziness, or fever.

## 2022-05-25 NOTE — ED ADULT TRIAGE NOTE - CHIEF COMPLAINT QUOTE
I ran out of my asthma medicine and I was having trouble breathing - patient  Patient received one combivent nebulizer via ambulance

## 2022-05-25 NOTE — ED PROVIDER NOTE - NS ED ROS FT
Constitutional: (-) fever  Eyes/ENT: (-) blurry vision, (-) epistaxis  Cardiovascular: (-) chest pain, (-) syncope  Respiratory: (-) cough, (+) shortness of breath (+) wheezing  Gastrointestinal: (-) vomiting, (-) diarrhea  Musculoskeletal: (-) neck pain, (-) back pain, (-) joint pain  Integumentary: (-) rash, (-) edema  Neurological: (-) headache, (-) altered mental status  Psychiatric: (-) hallucinations  Allergic/Immunologic: (-) pruritus

## 2022-05-25 NOTE — ED PROVIDER NOTE - PROGRESS NOTE DETAILS
Patient reports significant relief of SOB and wheezing on reassessment. Little to no wheezing on repeat exam.

## 2022-07-19 NOTE — ED ADULT NURSE NOTE - BREATHING, MLM
Original Prescribing Provider: Dr. Hernandez  Last Office Visit: 7/27/21 Dr Hernandez  Next Office Vist: 7/20/22 Dr Hernandez  Last Refill Sent: 5/19/22 Cyndie URIAS    Current Medications:  Current Outpatient Medications   Medication Sig Dispense Refill   • clonazePAM (KlonoPIN) 0.5 MG tablet Take 0.5 tablets by mouth 2 times daily. 30 tablet 1   • atorvastatin (LIPITOR) 40 MG tablet TAKE 1 TABLET BY MOUTH EVERY NIGHT 90 tablet 3   • calcium carbonate-vitamin D (CALTRATE+D) 600-400 MG-UNIT per tablet Take 1 tablet by mouth daily.     • pantoprazole (PROTONIX) 40 MG tablet TAKE 1 TABLET BY MOUTH TWICE DAILY 180 tablet 3   • bisoprolol-hydrochlorothiazide (ZIAC) 5-6.25 MG per tablet TAKE 1 TABLET BY MOUTH DAILY 90 tablet 3   • OLANZapine (ZyPREXA) 5 MG tablet TAKE 1/2 TABLET BY MOUTH TWICE DAILY 90 tablet 3   • polyethylene glycol (MIRALAX) powder Take 17 g by mouth as needed. Indications: Constipation      • aspirin 325 MG tablet Take 1 tablet by mouth daily. Hold for 1 week. 30 tablet 1   • lactobacillus acidophilus (BACID) Take 1 tablet by mouth 3 times daily (before meals). (Patient taking differently: Take 1 tablet by mouth daily (with dinner). ) 30 tablet 0   • vitamin - therapeutic multivitamins w/minerals (CENTRUM SILVER,THERA-M) Tab Take 1 tablet by mouth daily. 30 tablet 0     No current facility-administered medications for this visit.               
Spontaneous, unlabored and symmetrical

## 2022-07-22 ENCOUNTER — LABORATORY RESULT (OUTPATIENT)
Age: 32
End: 2022-07-22

## 2022-07-22 ENCOUNTER — OUTPATIENT (OUTPATIENT)
Dept: OUTPATIENT SERVICES | Facility: HOSPITAL | Age: 32
LOS: 1 days | Discharge: HOME | End: 2022-07-22

## 2022-07-22 ENCOUNTER — NON-APPOINTMENT (OUTPATIENT)
Age: 32
End: 2022-07-22

## 2022-07-22 ENCOUNTER — APPOINTMENT (OUTPATIENT)
Dept: PULMONOLOGY | Facility: CLINIC | Age: 32
End: 2022-07-22

## 2022-07-22 VITALS
HEIGHT: 67.3 IN | BODY MASS INDEX: 38.47 KG/M2 | DIASTOLIC BLOOD PRESSURE: 73 MMHG | WEIGHT: 248 LBS | SYSTOLIC BLOOD PRESSURE: 133 MMHG | HEART RATE: 91 BPM | TEMPERATURE: 97.9 F | OXYGEN SATURATION: 97 %

## 2022-07-22 DIAGNOSIS — Z00.00 ENCOUNTER FOR GENERAL ADULT MEDICAL EXAMINATION W/OUT ABNORMAL FINDINGS: ICD-10-CM

## 2022-07-22 PROCEDURE — 99203 OFFICE O/P NEW LOW 30 MIN: CPT | Mod: GC

## 2022-07-22 RX ORDER — PREDNISONE 20 MG/1
20 TABLET ORAL DAILY
Qty: 10 | Refills: 0 | Status: ACTIVE | COMMUNITY
Start: 2022-07-22 | End: 1900-01-01

## 2022-07-22 RX ADMIN — FLUTICASONE FUROATE, UMECLIDINIUM BROMIDE AND VILANTEROL TRIFENATATE 1 MCG/INH: 200; 62.5; 25 POWDER RESPIRATORY (INHALATION) at 00:00

## 2022-07-22 NOTE — REVIEW OF SYSTEMS
[Cough] : cough [Wheezing] : wheezing [SOB on Exertion] : sob on exertion [Fever] : no fever [Fatigue] : no fatigue [Nasal Congestion] : no nasal congestion [Sinus Problems] : no sinus problems [Sputum] : no sputum [Dyspnea] : no dyspnea

## 2022-07-22 NOTE — ASSESSMENT
[FreeTextEntry1] : 33 yo female h/o moderate persistent asthma\par takes Symbicort, albuterol nebulizer, pro Air at least two times daily and Singulair\par \par Plan\par d/c Symbicort\par Trelegy 200 i puff daily\par prednisone 40 for 5 days\par Continue Montelukast \par CXR\par PFT\par CBC diff, Ig panel, asthma panel\par Follow up in 4 months

## 2022-07-22 NOTE — HISTORY OF PRESENT ILLNESS
[Never] : never [TextBox_4] : 31 yo female, h.o asthma diagnosed since childhood, referred to our clinic for uncontrolled symptoms\par Patient takes Singulair, Pro Air , symbicort and albuterol nebulizer\par Patient symptoms worsens during spring and winter\par Patient never hospitalized or intubated for asthma\par No smoking history\par Using Pro air at least 2 times daily; and waking up at night using albuterol nebulizer\par her symptoms are uncontrolled; patient is actively wheezing\par patient has a dog at home since 2 months

## 2022-07-25 RX ORDER — FLUTICASONE FUROATE, UMECLIDINIUM BROMIDE AND VILANTEROL TRIFENATATE 200; 62.5; 25 UG/1; UG/1; UG/1
200-62.5-25 POWDER RESPIRATORY (INHALATION)
Qty: 4 | Refills: 0 | Status: ACTIVE | COMMUNITY
Start: 2022-07-22 | End: 1900-01-01

## 2022-07-29 LAB
BASOPHILS # BLD AUTO: 0.06 K/UL
BASOPHILS NFR BLD AUTO: 0.5 %
EOSINOPHIL # BLD AUTO: 0.7 K/UL
EOSINOPHIL NFR BLD AUTO: 6.3 %
HCT VFR BLD CALC: 43.4 %
HGB BLD-MCNC: 13.8 G/DL
IMM GRANULOCYTES NFR BLD AUTO: 0.4 %
LYMPHOCYTES # BLD AUTO: 3.07 K/UL
LYMPHOCYTES NFR BLD AUTO: 27.6 %
MAN DIFF?: NORMAL
MCHC RBC-ENTMCNC: 28.5 PG
MCHC RBC-ENTMCNC: 31.8 G/DL
MCV RBC AUTO: 89.7 FL
MONOCYTES # BLD AUTO: 0.64 K/UL
MONOCYTES NFR BLD AUTO: 5.8 %
NEUTROPHILS # BLD AUTO: 6.62 K/UL
NEUTROPHILS NFR BLD AUTO: 59.4 %
PLATELET # BLD AUTO: 349 K/UL
RBC # BLD: 4.84 M/UL
RBC # FLD: 13.2 %
WBC # FLD AUTO: 11.13 K/UL

## 2022-08-22 LAB
A ALTERNATA IGE QN: <0.1 KUA/L
A FUMIGATUS IGE QN: <0.1 KUA/L
BARLEY IGE QN: 0.39 KUA/L
C ALBICANS IGE QN: 0.58 KUA/L
C HERBARUM IGE QN: <0.1 KUA/L
CAT DANDER IGE QN: <0.1 KUA/L
CHERRY IGE QN: 0.19 KUA/L
COMMON RAGWEED IGE QN: 0.9 KUA/L
COW MILK IGE QN: 0.29 KUA/L
CRAB IGE QN: 2.15 KUA/L
D FARINAE IGE QN: 18.2 KUA/L
D PTERONYSS IGE QN: 19.8 KUA/L
DEPRECATED A ALTERNATA IGE RAST QL: 0
DEPRECATED A FUMIGATUS IGE RAST QL: 0
DEPRECATED BARLEY IGE RAST QL: 1
DEPRECATED C ALBICANS IGE RAST QL: 1
DEPRECATED C HERBARUM IGE RAST QL: 0
DEPRECATED CAT DANDER IGE RAST QL: 0
DEPRECATED CHERRY IGE RAST QL: NORMAL
DEPRECATED COMMON RAGWEED IGE RAST QL: 2
DEPRECATED COW MILK IGE RAST QL: NORMAL
DEPRECATED CRAB IGE RAST QL: 2
DEPRECATED D FARINAE IGE RAST QL: 4
DEPRECATED D PTERONYSS IGE RAST QL: 4
DEPRECATED DOG DANDER IGE RAST QL: 2
DEPRECATED EGG WHITE IGE RAST QL: NORMAL
DEPRECATED M RACEMOSUS IGE RAST QL: 0
DEPRECATED OAT IGE RAST QL: NORMAL
DEPRECATED PEANUT IGE RAST QL: NORMAL
DEPRECATED ROACH IGE RAST QL: 4
DEPRECATED RYE IGE RAST QL: 2
DEPRECATED SOYBEAN IGE RAST QL: 0
DEPRECATED TIMOTHY IGE RAST QL: NORMAL
DEPRECATED WHEAT IGE RAST QL: 2
DEPRECATED WHITE OAK IGE RAST QL: NORMAL
DOG DANDER IGE QN: 2.37 KUA/L
EGG WHITE IGE QN: 0.14 KUA/L
IGE SER-MCNC: 603 KU/L
M RACEMOSUS IGE QN: <0.1 KUA/L
OAT IGE QN: 0.23 KUA/L
PEANUT IGE QN: 0.14 KUA/L
ROACH IGE QN: 47.6 KUA/L
RYE IGE QN: 0.96 KUA/L
SOYBEAN IGE QN: <0.1 KUA/L
TIMOTHY IGE QN: 0.17 KUA/L
TOTAL IGE SMQN RAST: 603 KU/L
WHEAT IGE QN: 2.25 KUA/L
WHITE OAK IGE QN: 0.12 KUA/L

## 2022-09-16 LAB
A FLAVUS AB FLD QL: NEGATIVE
A FUMIGATUS AB FLD QL: NEGATIVE
A NIGER AB FLD QL: NEGATIVE
BUDGERIGAR FEATHER (E78) CLASS: 0
BUDGERIGAR FEATHER (E78) CONC: <0.1 KUA/L
CHICKEN FEATHER IGE QN: <0.1 KUA/L
DEPRECATED CHICKEN FEATHER IGE RAST QL: 0
DEPRECATED DUCK FEATHER IGE RAST QL: 0
DEPRECATED GOOSE FEATHER IGE RAST QL: 0
DEPRECATED KAPPA LC FREE/LAMBDA SER: 1.38 RATIO
DUCK FEATHER IGE QN: <0.1 KUA/L
GOOSE FEATHER IGE QN: <0.1 KUA/L
IGA SER QL IEP: 180 MG/DL
IGG SER QL IEP: 1006 MG/DL
IGM SER QL IEP: 83 MG/DL
KAPPA LC CSF-MCNC: 1.25 MG/DL
KAPPA LC SERPL-MCNC: 1.72 MG/DL

## 2022-10-24 NOTE — ED PROVIDER NOTE - PHYSICAL EXAMINATION
Constitutional: Well developed, well nourished. NAD  Head: Normocephalic, atraumatic.  Eyes: PERRL, EOMI.  ENT: No nasal discharge. Mucous membranes dry.  Neck: Supple. Painless ROM.  Cardiovascular:  Regular rate and rhythm.    Pulmonary: mild minimal wheezing  Abdominal: Soft. Nondistended. No rebound, guarding, rigidity.  Extremities. Pelvis stable. No lower extremity edema, symmetric calves.  Skin: No rashes, cyanosis.  Neuro: AAOx3. No focal neurological deficits.  Psych: Normal mood. Normal affect. no

## 2022-11-18 ENCOUNTER — APPOINTMENT (OUTPATIENT)
Dept: PULMONOLOGY | Facility: CLINIC | Age: 32
End: 2022-11-18

## 2022-11-30 ENCOUNTER — INPATIENT (INPATIENT)
Facility: HOSPITAL | Age: 32
LOS: 2 days | Discharge: HOME | End: 2022-12-03
Attending: INTERNAL MEDICINE | Admitting: INTERNAL MEDICINE
Payer: MEDICAID

## 2022-11-30 VITALS — OXYGEN SATURATION: 100 % | HEART RATE: 123 BPM

## 2022-11-30 LAB
ALBUMIN SERPL ELPH-MCNC: 4.1 G/DL — SIGNIFICANT CHANGE UP (ref 3.5–5.2)
ALP SERPL-CCNC: 92 U/L — SIGNIFICANT CHANGE UP (ref 30–115)
ALT FLD-CCNC: 14 U/L — SIGNIFICANT CHANGE UP (ref 0–41)
AMPHET UR-MCNC: NEGATIVE — SIGNIFICANT CHANGE UP
ANION GAP SERPL CALC-SCNC: 15 MMOL/L — HIGH (ref 7–14)
APPEARANCE UR: CLEAR — SIGNIFICANT CHANGE UP
APTT BLD: 24.4 SEC — LOW (ref 27–39.2)
AST SERPL-CCNC: 23 U/L — SIGNIFICANT CHANGE UP (ref 0–41)
BARBITURATES UR SCN-MCNC: NEGATIVE — SIGNIFICANT CHANGE UP
BASE EXCESS BLDV CALC-SCNC: -8.6 MMOL/L — LOW (ref -2–3)
BASOPHILS # BLD AUTO: 0.11 K/UL — SIGNIFICANT CHANGE UP (ref 0–0.2)
BASOPHILS NFR BLD AUTO: 0.5 % — SIGNIFICANT CHANGE UP (ref 0–1)
BENZODIAZ UR-MCNC: POSITIVE
BILIRUB SERPL-MCNC: <0.2 MG/DL — SIGNIFICANT CHANGE UP (ref 0.2–1.2)
BILIRUB UR-MCNC: NEGATIVE — SIGNIFICANT CHANGE UP
BUN SERPL-MCNC: 10 MG/DL — SIGNIFICANT CHANGE UP (ref 10–20)
CA-I SERPL-SCNC: 1.23 MMOL/L — SIGNIFICANT CHANGE UP (ref 1.15–1.33)
CALCIUM SERPL-MCNC: 8.4 MG/DL — SIGNIFICANT CHANGE UP (ref 8.4–10.5)
CHLORIDE SERPL-SCNC: 99 MMOL/L — SIGNIFICANT CHANGE UP (ref 98–110)
CO2 SERPL-SCNC: 20 MMOL/L — SIGNIFICANT CHANGE UP (ref 17–32)
COCAINE METAB.OTHER UR-MCNC: NEGATIVE — SIGNIFICANT CHANGE UP
COLOR SPEC: SIGNIFICANT CHANGE UP
CREAT SERPL-MCNC: 0.9 MG/DL — SIGNIFICANT CHANGE UP (ref 0.7–1.5)
DIFF PNL FLD: NEGATIVE — SIGNIFICANT CHANGE UP
DRUG SCREEN 1, URINE RESULT: SIGNIFICANT CHANGE UP
EGFR: 77 ML/MIN/1.73M2 — SIGNIFICANT CHANGE UP
EOSINOPHIL # BLD AUTO: 1.39 K/UL — HIGH (ref 0–0.7)
EOSINOPHIL NFR BLD AUTO: 6.5 % — SIGNIFICANT CHANGE UP (ref 0–8)
FENTANYL UR QL: NEGATIVE — SIGNIFICANT CHANGE UP
FLUAV AG NPH QL: SIGNIFICANT CHANGE UP
FLUBV AG NPH QL: SIGNIFICANT CHANGE UP
GAS PNL BLDA: SIGNIFICANT CHANGE UP
GAS PNL BLDA: SIGNIFICANT CHANGE UP
GAS PNL BLDV: 133 MMOL/L — LOW (ref 136–145)
GAS PNL BLDV: SIGNIFICANT CHANGE UP
GLUCOSE BLDC GLUCOMTR-MCNC: 136 MG/DL — HIGH (ref 70–99)
GLUCOSE SERPL-MCNC: 305 MG/DL — HIGH (ref 70–99)
GLUCOSE UR QL: SIGNIFICANT CHANGE UP
HCG SERPL QL: NEGATIVE — SIGNIFICANT CHANGE UP
HCO3 BLDV-SCNC: 24 MMOL/L — SIGNIFICANT CHANGE UP (ref 22–29)
HCT VFR BLD CALC: 43.1 % — SIGNIFICANT CHANGE UP (ref 37–47)
HCT VFR BLDA CALC: 41 % — SIGNIFICANT CHANGE UP (ref 39–51)
HGB BLD CALC-MCNC: 13.8 G/DL — SIGNIFICANT CHANGE UP (ref 12.6–17.4)
HGB BLD-MCNC: 13.7 G/DL — SIGNIFICANT CHANGE UP (ref 12–16)
IMM GRANULOCYTES NFR BLD AUTO: 2.8 % — HIGH (ref 0.1–0.3)
INR BLD: 0.91 RATIO — SIGNIFICANT CHANGE UP (ref 0.65–1.3)
KETONES UR-MCNC: NEGATIVE — SIGNIFICANT CHANGE UP
LACTATE BLDV-MCNC: 4.6 MMOL/L — CRITICAL HIGH (ref 0.5–2)
LEUKOCYTE ESTERASE UR-ACNC: NEGATIVE — SIGNIFICANT CHANGE UP
LYMPHOCYTES # BLD AUTO: 34.6 % — SIGNIFICANT CHANGE UP (ref 20.5–51.1)
LYMPHOCYTES # BLD AUTO: 7.39 K/UL — HIGH (ref 1.2–3.4)
MCHC RBC-ENTMCNC: 29.3 PG — SIGNIFICANT CHANGE UP (ref 27–31)
MCHC RBC-ENTMCNC: 31.8 G/DL — LOW (ref 32–37)
MCV RBC AUTO: 92.1 FL — SIGNIFICANT CHANGE UP (ref 81–99)
METHADONE UR-MCNC: NEGATIVE — SIGNIFICANT CHANGE UP
MONOCYTES # BLD AUTO: 1.18 K/UL — HIGH (ref 0.1–0.6)
MONOCYTES NFR BLD AUTO: 5.5 % — SIGNIFICANT CHANGE UP (ref 1.7–9.3)
NEUTROPHILS # BLD AUTO: 10.71 K/UL — HIGH (ref 1.4–6.5)
NEUTROPHILS NFR BLD AUTO: 50.1 % — SIGNIFICANT CHANGE UP (ref 42.2–75.2)
NITRITE UR-MCNC: NEGATIVE — SIGNIFICANT CHANGE UP
NRBC # BLD: 0 /100 WBCS — SIGNIFICANT CHANGE UP (ref 0–0)
OPIATES UR-MCNC: NEGATIVE — SIGNIFICANT CHANGE UP
OXYCODONE UR-MCNC: NEGATIVE — SIGNIFICANT CHANGE UP
PCO2 BLDV: 86 MMHG — HIGH (ref 39–42)
PCP UR-MCNC: NEGATIVE — SIGNIFICANT CHANGE UP
PH BLDV: 7.05 — LOW (ref 7.32–7.43)
PH UR: 6.5 — SIGNIFICANT CHANGE UP (ref 5–8)
PLATELET # BLD AUTO: 451 K/UL — HIGH (ref 130–400)
PO2 BLDV: 117 MMHG — SIGNIFICANT CHANGE UP
POTASSIUM BLDV-SCNC: 5.3 MMOL/L — HIGH (ref 3.5–5.1)
POTASSIUM SERPL-MCNC: 4.5 MMOL/L — SIGNIFICANT CHANGE UP (ref 3.5–5)
POTASSIUM SERPL-SCNC: 4.5 MMOL/L — SIGNIFICANT CHANGE UP (ref 3.5–5)
PROPOXYPHENE QUALITATIVE URINE RESULT: NEGATIVE — SIGNIFICANT CHANGE UP
PROT SERPL-MCNC: 6.6 G/DL — SIGNIFICANT CHANGE UP (ref 6–8)
PROT UR-MCNC: SIGNIFICANT CHANGE UP
PROTHROM AB SERPL-ACNC: 10.3 SEC — SIGNIFICANT CHANGE UP (ref 9.95–12.87)
RBC # BLD: 4.68 M/UL — SIGNIFICANT CHANGE UP (ref 4.2–5.4)
RBC # FLD: 13.1 % — SIGNIFICANT CHANGE UP (ref 11.5–14.5)
RSV RNA NPH QL NAA+NON-PROBE: SIGNIFICANT CHANGE UP
SAO2 % BLDV: 98.8 % — SIGNIFICANT CHANGE UP
SARS-COV-2 RNA SPEC QL NAA+PROBE: SIGNIFICANT CHANGE UP
SODIUM SERPL-SCNC: 134 MMOL/L — LOW (ref 135–146)
SP GR SPEC: 1.04 — HIGH (ref 1.01–1.03)
THC UR QL: NEGATIVE — SIGNIFICANT CHANGE UP
TROPONIN T SERPL-MCNC: <0.01 NG/ML — SIGNIFICANT CHANGE UP
UROBILINOGEN FLD QL: SIGNIFICANT CHANGE UP
WBC # BLD: 21.38 K/UL — HIGH (ref 4.8–10.8)
WBC # FLD AUTO: 21.38 K/UL — HIGH (ref 4.8–10.8)

## 2022-11-30 PROCEDURE — 93010 ELECTROCARDIOGRAM REPORT: CPT

## 2022-11-30 PROCEDURE — 99291 CRITICAL CARE FIRST HOUR: CPT | Mod: GC

## 2022-11-30 PROCEDURE — 99291 CRITICAL CARE FIRST HOUR: CPT

## 2022-11-30 PROCEDURE — 71045 X-RAY EXAM CHEST 1 VIEW: CPT | Mod: 26

## 2022-11-30 PROCEDURE — 71275 CT ANGIOGRAPHY CHEST: CPT | Mod: 26,MA

## 2022-11-30 PROCEDURE — 70450 CT HEAD/BRAIN W/O DYE: CPT | Mod: 26,MA

## 2022-11-30 RX ORDER — SODIUM CHLORIDE 9 MG/ML
1000 INJECTION INTRAMUSCULAR; INTRAVENOUS; SUBCUTANEOUS ONCE
Refills: 0 | Status: COMPLETED | OUTPATIENT
Start: 2022-11-30 | End: 2022-11-30

## 2022-11-30 RX ORDER — ROCURONIUM BROMIDE 10 MG/ML
100 VIAL (ML) INTRAVENOUS ONCE
Refills: 0 | Status: COMPLETED | OUTPATIENT
Start: 2022-11-30 | End: 2022-11-30

## 2022-11-30 RX ORDER — FENTANYL CITRATE 50 UG/ML
0.5 INJECTION INTRAVENOUS
Qty: 2500 | Refills: 0 | Status: DISCONTINUED | OUTPATIENT
Start: 2022-11-30 | End: 2022-12-01

## 2022-11-30 RX ORDER — LEVETIRACETAM 250 MG/1
1500 TABLET, FILM COATED ORAL EVERY 12 HOURS
Refills: 0 | Status: DISCONTINUED | OUTPATIENT
Start: 2022-11-30 | End: 2022-12-01

## 2022-11-30 RX ORDER — KETAMINE HYDROCHLORIDE 100 MG/ML
50 INJECTION INTRAMUSCULAR; INTRAVENOUS ONCE
Refills: 0 | Status: DISCONTINUED | OUTPATIENT
Start: 2022-11-30 | End: 2022-11-30

## 2022-11-30 RX ORDER — IPRATROPIUM/ALBUTEROL SULFATE 18-103MCG
3 AEROSOL WITH ADAPTER (GRAM) INHALATION EVERY 6 HOURS
Refills: 0 | Status: DISCONTINUED | OUTPATIENT
Start: 2022-11-30 | End: 2022-12-01

## 2022-11-30 RX ORDER — ALBUTEROL 90 UG/1
2 AEROSOL, METERED ORAL ONCE
Refills: 0 | Status: DISCONTINUED | OUTPATIENT
Start: 2022-11-30 | End: 2022-12-03

## 2022-11-30 RX ORDER — PROPOFOL 10 MG/ML
10 INJECTION, EMULSION INTRAVENOUS
Qty: 1000 | Refills: 0 | Status: DISCONTINUED | OUTPATIENT
Start: 2022-11-30 | End: 2022-12-02

## 2022-11-30 RX ORDER — ENOXAPARIN SODIUM 100 MG/ML
40 INJECTION SUBCUTANEOUS EVERY 24 HOURS
Refills: 0 | Status: DISCONTINUED | OUTPATIENT
Start: 2022-11-30 | End: 2022-12-03

## 2022-11-30 RX ORDER — DEXMEDETOMIDINE HYDROCHLORIDE IN 0.9% SODIUM CHLORIDE 4 UG/ML
0.2 INJECTION INTRAVENOUS
Qty: 200 | Refills: 0 | Status: DISCONTINUED | OUTPATIENT
Start: 2022-11-30 | End: 2022-12-01

## 2022-11-30 RX ORDER — MIDAZOLAM HYDROCHLORIDE 1 MG/ML
0.02 INJECTION, SOLUTION INTRAMUSCULAR; INTRAVENOUS
Qty: 100 | Refills: 0 | Status: DISCONTINUED | OUTPATIENT
Start: 2022-11-30 | End: 2022-12-01

## 2022-11-30 RX ORDER — LEVETIRACETAM 250 MG/1
2000 TABLET, FILM COATED ORAL ONCE
Refills: 0 | Status: COMPLETED | OUTPATIENT
Start: 2022-11-30 | End: 2022-11-30

## 2022-11-30 RX ADMIN — DEXMEDETOMIDINE HYDROCHLORIDE IN 0.9% SODIUM CHLORIDE 5 MICROGRAM(S)/KG/HR: 4 INJECTION INTRAVENOUS at 15:23

## 2022-11-30 RX ADMIN — MIDAZOLAM HYDROCHLORIDE 2 MG/KG/HR: 1 INJECTION, SOLUTION INTRAMUSCULAR; INTRAVENOUS at 08:18

## 2022-11-30 RX ADMIN — FENTANYL CITRATE 5 MICROGRAM(S)/KG/HR: 50 INJECTION INTRAVENOUS at 14:58

## 2022-11-30 RX ADMIN — Medication 125 MILLIGRAM(S): at 07:30

## 2022-11-30 RX ADMIN — PROPOFOL 6 MICROGRAM(S)/KG/MIN: 10 INJECTION, EMULSION INTRAVENOUS at 07:13

## 2022-11-30 RX ADMIN — MIDAZOLAM HYDROCHLORIDE 2 MG/KG/HR: 1 INJECTION, SOLUTION INTRAMUSCULAR; INTRAVENOUS at 07:45

## 2022-11-30 RX ADMIN — SODIUM CHLORIDE 1000 MILLILITER(S): 9 INJECTION INTRAMUSCULAR; INTRAVENOUS; SUBCUTANEOUS at 08:00

## 2022-11-30 RX ADMIN — LEVETIRACETAM 600 MILLIGRAM(S): 250 TABLET, FILM COATED ORAL at 23:50

## 2022-11-30 RX ADMIN — Medication 100 MILLIGRAM(S): at 06:15

## 2022-11-30 NOTE — CONSULT NOTE ADULT - ASSESSMENT
IMPRESSION:  Acute hypoxemic respiratory failure  Severe asthma exacerbation      PLAN:    CNS: Precedex, add fentanyl as needed, CT head reviewed with findings suggestive of anoxic brain injury    HEENT: Oral care, ETT care    PULMONARY:  HOB @ 45 degrees.  Vent changes as follows:     CARDIOVASCULAR: MAP adequate, avoid volume overload    GI: GI prophylaxis.  Feeding.  Bowel regimen     RENAL:  Follow up lytes.  Correct as needed    INFECTIOUS DISEASE: Follow up cultures, Full RVP,     HEMATOLOGICAL:  DVT prophylaxis.    ENDOCRINE:  Follow up FS.  Insulin protocol if needed.    MUSCULOSKELETAL: bedrest    MICU         IMPRESSION:  Acute hypoxemic respiratory failure  Severe asthma exacerbation      PLAN:    CNS: wean propofol, and versed, start Precedex and fentanyl, CT head reviewed with findings suggestive of anoxic brain injury,     HEENT: Oral care, ETT care    PULMONARY:  HOB @ 45 degrees.  Vent changes as follows: keep  (~6ml/kg), lower RR to 16, lower Fio2 to 60%, repeat ABG in 1 hr, solumedrol 60 Q8 hr, albuterol nebs q 4 hr     CARDIOVASCULAR: MAP adequate, avoid volume overload, ECHO    GI: GI prophylaxis.  Feeding.  Bowel regimen     RENAL:  Follow up lytes.  Correct as needed    INFECTIOUS DISEASE: Follow up cultures, Full RVP,     HEMATOLOGICAL:  DVT prophylaxis.    ENDOCRINE:  Follow up FS.  Insulin protocol if needed.    MUSCULOSKELETAL: bedrest    MICU         IMPRESSION:  Acute hypoxemic respiratory failure  Severe asthma exacerbation      PLAN:    CNS: wean propofol, and versed, start Precedex and fentanyl, CT head reviewed with findings suggestive of anoxic brain injury,     HEENT: Oral care, ETT care    PULMONARY:  HOB @ 45 degrees.  Vent changes as follows: keep  (~6ml/kg), lower RR to 16, lower Fio2 to 40%, repeat ABG in 1 hr, solumedrol 60 Q8 hr, albuterol nebs q 4 hr     CARDIOVASCULAR: MAP adequate, avoid volume overload, ECHO    GI: GI prophylaxis.  Feeding.  Bowel regimen     RENAL:  Follow up lytes.  Correct as needed    INFECTIOUS DISEASE: Follow up cultures, Full RVP,     HEMATOLOGICAL:  DVT prophylaxis.    ENDOCRINE:  Follow up FS.  Insulin protocol if needed.    MUSCULOSKELETAL: bedrest    MICU

## 2022-11-30 NOTE — H&P ADULT - NSHPPHYSICALEXAM_GEN_ALL_CORE
GENERAL: intubated, sedated  HEAD:  Atraumatic, Normocephalic  CHEST/LUNG: Clear to auscultation bilaterally, intubated  HEART: Regular rate and rhythm; No murmurs, rubs, or gallops  ABDOMEN: Soft, nontender, nondistended  EXTREMITIES:  No clubbing, cyanosis, or edema  NERVOUS SYSTEM:  intubated, sedated

## 2022-11-30 NOTE — ED PROVIDER NOTE - OBJECTIVE STATEMENT
?52 yo female hx of asthma BIBA 2/2 respiratory arrest. as per EMS, patient went to her neighbor and knocking on the door and asking for help. Patient then collapsed. Neighbor said patient had hx of asthma. EMS intubated patient on scene and brought patient to ED for evaluation.

## 2022-11-30 NOTE — ED ADULT NURSE NOTE - OBJECTIVE STATEMENT
pt found outside of neighbors home, unconscious with a pulse. Was intubated in field. Was given 3 epi's, 2g mag, 20mg etomidate and 5mg versed for intubation. When arrived given 100mg eliel and put on propofol for post intub

## 2022-11-30 NOTE — ED PROVIDER NOTE - ATTENDING CONTRIBUTION TO CARE
Unknown aged female with PMH of Asthma (unclear severity) brought to ED by EMS for acute respiratory failure. She reportedly went to a neighbor's door saying she was having difficulty breathing and felt like she might pass out. She was noted to be in respiratory distress, and passed out within few minuted. EMS arrived and found her apneic with a pulse. She was intubated in the field.  No further history or ROS is available.  CXR, CT's noted.  Remains intubated and sedated.  ADMIT ICU.

## 2022-11-30 NOTE — H&P ADULT - HISTORY OF PRESENT ILLNESS
Pt is (reportedly) a 30 y/o F with autism and asthma? who is BIBA after respiratory (cardiac?) arrest. Reportedly the pt's name is Marysol Carlton and lives at [address] [place] [address]. Pt was intubated in the field by EMS. Collateral history obtained from multiple sources. The ED reports that pt received one push of epi in the field, they are unsure if the patient was actually in cardiac arrest or not when ems arrived at the scene, unsure if pt had undergone any chest compressions. More hx obtained from pt's neighbor Ms. Troncoso ([phone], this number was obtained from sue elliott-see below). Ms. Troncoso states that early this morning the pt was frantically knocking on her door, when Ms troncoso opened the door, the pt she was not wearing any clothes and was in severe respiratory distress saying that she couldn't breathe. Ms. Troncoso states that the pt started turning blue and collapsed on the floor, she called ems, she states that she believes that she was not breathing for at least 5 minutes before ems arrived. When ems arrived they intubated her and "gave her some medication through the arm" (presumably epi?). Ms. troncoso is not sure if they actually did any chest compressions on her as she was crying and distraught and was not entirely able to paying attention. Ms. Troncoso states that the only family she is aware of that the pt has is a cousin and a brother named Julian carlton (does not know his number), [facility] and states that they might have more info: [phone]. Ms. 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 Ms. Rogers states that she is a family friend of the pt and describes herself as a "grandmother" to the pt's 6 year old daughter (who is also autistic). Ms. Rogers states that the pt is autistic and believes she has asthma, does not know if she takes any medications and is unsure if she has any other pmh, she does not know the numbers to the pt's brother or cousin. Ms. Rogers provided the phone number for the pt's neighbor above.     In the ED, pt given fluids and solumedrol, started on a propofol and versed drip  Vitals: Vital Signs Last 24 Hrs  T(C): 36.6 (30 Nov 2022 12:47), Max: 36.6 (30 Nov 2022 12:47)  T(F): 97.9 (30 Nov 2022 12:47), Max: 97.9 (30 Nov 2022 12:47)  HR: 82 (30 Nov 2022 12:47) (78 - 123)  BP: 108/60 (30 Nov 2022 12:47) (94/68 - 147/71)  BP(mean): 77 (30 Nov 2022 12:00) (76 - 99)  RR: 20 (30 Nov 2022 12:47) (14 - 20)  SpO2: 99% (30 Nov 2022 12:47) (99% - 100%)    Parameters below as of 30 Nov 2022 12:47  Patient On (Oxygen Delivery Method): ventilator    Labs: remarkbale for wbc 21    Imaging:   < from: CT Angio Chest PE Protocol w/ IV Cont (11.30.22 @ 09:02) >  IMPRESSION:    1.  No evidence of a pulmonary embolism.    2.  Endotracheal tube is in satisfactory position. Minimal secretions in   the distal trachea and right mainstem bronchus.    < end of copied text >  < from: CT Head No Cont (11.30.22 @ 09:01) >  IMPRESSION:    Diffuse effacement of the bilateral cerebral/cerebellar sulci and   scattered blurring of gray-white matter differentiation. The finding   could reflect global hypoxic/ischemic injury.    No intracranial hemorrhage or midline shift.    < end of copied text >  < from: Xray Chest 1 View-PORTABLE IMMEDIATE (Xray Chest 1 View-PORTABLE IMMEDIATE .) (11.30.22 @ 06:53) >  Impression:    No radiographic evidence of acute cardiopulmonary disease.    ETT.    < end of copied text >            ?54 yo female hx of asthma BIBA 2/2 respiratory arrest. as per EMS, patient went to her neighbor and knocking on the door and asking for help. Patient then collapsed. Neighbor said patient had hx of asthma. EMS intubated patient on scene and brought patient to ED for evaluation.

## 2022-11-30 NOTE — CONSULT NOTE ADULT - SUBJECTIVE AND OBJECTIVE BOX
Patient is a 53y old  Female who presents with a chief complaint of collapse and  respiratory arrest.    HPI:  52 yo female with PMH of Asthma (unclear severity) was brought tot he ED by EMS for acute respiratory failure. reportedly she went to the Neighbour's door saying she was having difficulty breathing and felt like she might pass out. And she passed out within few minuted. EMS arrived and found her apneic but had pulse. she was intubated in the field      PAST MEDICAL & SURGICAL HISTORY:  Asthma        SOCIAL HX:   Smoking                               FAMILY HISTORY:  :  No known Cardio vascular family history     Review Of Systems:     All ROS are negative except per HPI       Allergies    Allergy Status Unknown    Intolerances          PHYSICAL EXAM    ICU Vital Signs Last 24 Hrs  T(C): --  T(F): --  HR: 82 (30 Nov 2022 09:00) (82 - 123)  BP: 106/59 (30 Nov 2022 09:00) (105/61 - 147/71)  BP(mean): 79 (30 Nov 2022 09:00) (77 - 99)  ABP: --  ABP(mean): --  RR: 18 (30 Nov 2022 09:00) (14 - 18)  SpO2: 100% (30 Nov 2022 09:00) (100% - 100%)    O2 Parameters below as of 30 Nov 2022 09:00  Patient On (Oxygen Delivery Method): ventilator            CONSTITUTIONAL:  intubated on MV.     ENT:   Airway patent,   Mouth with normal mucosa.     CARDIAC:   Normal rate,   Regular rhythm.    No edema      RESPIRATORY:   wheezing  Bilateral BS   Not tachypneic,  No use of accessory muscles    GASTROINTESTINAL:  Abdomen soft,   Non-tender,   No guarding,   + BS    NEUROLOGICAL:   intubated sedated    SKIN:   Skin normal color for race,   No evidence of rash.                LABS:                          13.7   21.38 )-----------( 451      ( 30 Nov 2022 06:20 )             43.1                                               11-30    134<L>  |  99  |  10  ----------------------------<  305<H>  4.5   |  20  |  0.9    Ca    8.4      30 Nov 2022 06:20    TPro  6.6  /  Alb  4.1  /  TBili  <0.2  /  DBili  x   /  AST  23  /  ALT  14  /  AlkPhos  92  11-30      PT/INR - ( 30 Nov 2022 06:20 )   PT: 10.30 sec;   INR: 0.91 ratio         PTT - ( 30 Nov 2022 06:20 )  PTT:24.4 sec                                           CARDIAC MARKERS ( 30 Nov 2022 06:20 )  x     / <0.01 ng/mL / x     / x     / x                                                LIVER FUNCTIONS - ( 30 Nov 2022 06:20 )  Alb: 4.1 g/dL / Pro: 6.6 g/dL / ALK PHOS: 92 U/L / ALT: 14 U/L / AST: 23 U/L / GGT: x                                                                                               Mode: AC/ CMV (Assist Control/ Continuous Mandatory Ventilation)  RR (machine): 18  TV (machine): 450  FiO2: 60  PEEP: 8  ITime: 1  MAP: 15  PIP: 36                                      ABG - ( 30 Nov 2022 06:43 )  pH, Arterial: 7.22  pH, Blood: x     /  pCO2: 57    /  pO2: 281   / HCO3: 23    / Base Excess: -5.2  /  SaO2: 100.0               X-Rays  clear                                                                          ECHO not available      MEDICATIONS  (STANDING):  albuterol    90 MICROgram(s) HFA Inhaler 2 Puff(s) Inhalation Once  midazolam Infusion 0.02 mG/kG/Hr (2 mL/Hr) IV Continuous <Continuous>  propofol Infusion 10 MICROgram(s)/kG/Min (6 mL/Hr) IV Continuous <Continuous>    MEDICATIONS  (PRN):         Patient is a 53y old  Female who presents with a chief complaint of collapse and  respiratory arrest.    HPI:  52 yo female with PMH of Asthma (unclear severity) was brought tot he ED by EMS for acute respiratory failure. reportedly she went to the Neighbour's door saying she was having difficulty breathing and felt like she might pass out. And she passed out within few minuted. EMS arrived and found her apneic but had pulse. she was intubated in the field      PAST MEDICAL & SURGICAL HISTORY:  Asthma        SOCIAL HX:   Smoking                               FAMILY HISTORY:  :  No known Cardio vascular family history     Review Of Systems:     All ROS are negative except per HPI       Allergies    Allergy Status Unknown    Intolerances          PHYSICAL EXAM    ICU Vital Signs Last 24 Hrs  T(C): --  T(F): --  HR: 82 (30 Nov 2022 09:00) (82 - 123)  BP: 106/59 (30 Nov 2022 09:00) (105/61 - 147/71)  BP(mean): 79 (30 Nov 2022 09:00) (77 - 99)  ABP: --  ABP(mean): --  RR: 18 (30 Nov 2022 09:00) (14 - 18)  SpO2: 100% (30 Nov 2022 09:00) (100% - 100%)    O2 Parameters below as of 30 Nov 2022 09:00  Patient On (Oxygen Delivery Method): ventilator            CONSTITUTIONAL:  intubated on MV.     ENT:   Airway patent,   Mouth with normal mucosa.     CARDIAC:   Normal rate,   Regular rhythm.    No edema      RESPIRATORY:   no wheezing but diminished breath sounds    GASTROINTESTINAL:  Abdomen soft,   Non-tender,   No guarding,   + BS    NEUROLOGICAL:   intubated sedated    SKIN:   Skin normal color for race,   No evidence of rash.                LABS:                          13.7   21.38 )-----------( 451      ( 30 Nov 2022 06:20 )             43.1                                               11-30    134<L>  |  99  |  10  ----------------------------<  305<H>  4.5   |  20  |  0.9    Ca    8.4      30 Nov 2022 06:20    TPro  6.6  /  Alb  4.1  /  TBili  <0.2  /  DBili  x   /  AST  23  /  ALT  14  /  AlkPhos  92  11-30      PT/INR - ( 30 Nov 2022 06:20 )   PT: 10.30 sec;   INR: 0.91 ratio         PTT - ( 30 Nov 2022 06:20 )  PTT:24.4 sec                                           CARDIAC MARKERS ( 30 Nov 2022 06:20 )  x     / <0.01 ng/mL / x     / x     / x                                                LIVER FUNCTIONS - ( 30 Nov 2022 06:20 )  Alb: 4.1 g/dL / Pro: 6.6 g/dL / ALK PHOS: 92 U/L / ALT: 14 U/L / AST: 23 U/L / GGT: x                                                                                               Mode: AC/ CMV (Assist Control/ Continuous Mandatory Ventilation)  RR (machine): 18  TV (machine): 450  FiO2: 100  PEEP: 8  ITime: 1  MAP: 15  PIP: 36                                      ABG - ( 30 Nov 2022 06:43 )  pH, Arterial: 7.22  pH, Blood: x     /  pCO2: 57    /  pO2: 281   / HCO3: 23    / Base Excess: -5.2  /  SaO2: 100.0               X-Rays  clear                                                                          ECHO not available      MEDICATIONS  (STANDING):  albuterol    90 MICROgram(s) HFA Inhaler 2 Puff(s) Inhalation Once  midazolam Infusion 0.02 mG/kG/Hr (2 mL/Hr) IV Continuous <Continuous>  propofol Infusion 10 MICROgram(s)/kG/Min (6 mL/Hr) IV Continuous <Continuous>    MEDICATIONS  (PRN):         Patient is a 53y old  Female who presents with a chief complaint of collapse and  respiratory arrest.    HPI:  54 yo female with PMH of Asthma (unclear severity) was brought tot he ED by EMS for acute respiratory failure. reportedly she went to the Neighbour's door saying she was having difficulty breathing and felt like she might pass out. And she passed out within few minuted. EMS arrived and found her apneic but had pulse. she was intubated in the field.  No further history or ROS is available.      PAST MEDICAL & SURGICAL HISTORY:  Asthma        SOCIAL HX:   Smoking                               FAMILY HISTORY:  :  No known Cardio vascular family history     Review Of Systems:     All ROS are negative except per HPI       Allergies    Allergy Status Unknown    Intolerances          PHYSICAL EXAM    ICU Vital Signs Last 24 Hrs  T(C): --  T(F): --  HR: 82 (30 Nov 2022 09:00) (82 - 123)  BP: 106/59 (30 Nov 2022 09:00) (105/61 - 147/71)  BP(mean): 79 (30 Nov 2022 09:00) (77 - 99)  ABP: --  ABP(mean): --  RR: 18 (30 Nov 2022 09:00) (14 - 18)  SpO2: 100% (30 Nov 2022 09:00) (100% - 100%)    O2 Parameters below as of 30 Nov 2022 09:00  Patient On (Oxygen Delivery Method): ventilator            CONSTITUTIONAL:  intubated on MV.     ENT:   Airway patent,   Mouth with normal mucosa.     CARDIAC:   Normal rate,   Regular rhythm.    No edema      RESPIRATORY:   no wheezing but diminished breath sounds    GASTROINTESTINAL:  Abdomen soft,   Non-tender,   No guarding,   + BS    NEUROLOGICAL:   intubated sedated    SKIN:   Skin normal color for race,   No evidence of rash.                LABS:                          13.7   21.38 )-----------( 451      ( 30 Nov 2022 06:20 )             43.1                                               11-30    134<L>  |  99  |  10  ----------------------------<  305<H>  4.5   |  20  |  0.9    Ca    8.4      30 Nov 2022 06:20    TPro  6.6  /  Alb  4.1  /  TBili  <0.2  /  DBili  x   /  AST  23  /  ALT  14  /  AlkPhos  92  11-30      PT/INR - ( 30 Nov 2022 06:20 )   PT: 10.30 sec;   INR: 0.91 ratio         PTT - ( 30 Nov 2022 06:20 )  PTT:24.4 sec                                           CARDIAC MARKERS ( 30 Nov 2022 06:20 )  x     / <0.01 ng/mL / x     / x     / x                                                LIVER FUNCTIONS - ( 30 Nov 2022 06:20 )  Alb: 4.1 g/dL / Pro: 6.6 g/dL / ALK PHOS: 92 U/L / ALT: 14 U/L / AST: 23 U/L / GGT: x                                                                                               Mode: AC/ CMV (Assist Control/ Continuous Mandatory Ventilation)  RR (machine): 18  TV (machine): 450  FiO2: 100  PEEP: 8  ITime: 1  MAP: 15  PIP: 36                                      ABG - ( 30 Nov 2022 06:43 )  pH, Arterial: 7.22  pH, Blood: x     /  pCO2: 57    /  pO2: 281   / HCO3: 23    / Base Excess: -5.2  /  SaO2: 100.0               X-Rays  clear                                                                          ECHO not available      MEDICATIONS  (STANDING):  albuterol    90 MICROgram(s) HFA Inhaler 2 Puff(s) Inhalation Once  midazolam Infusion 0.02 mG/kG/Hr (2 mL/Hr) IV Continuous <Continuous>  propofol Infusion 10 MICROgram(s)/kG/Min (6 mL/Hr) IV Continuous <Continuous>    MEDICATIONS  (PRN):

## 2022-11-30 NOTE — ED PROVIDER NOTE - CLINICAL SUMMARY MEDICAL DECISION MAKING FREE TEXT BOX
Received from signout by Dr. Beck.  Unknown age female with PMH of Asthma (unclear severity) was brought to ED by EMS for acute respiratory failure. Pt reportedly went to the neighbor's door saying she was having difficulty breathing and felt like she might pass out.  She was noted to be in respiratory distress, passed out within few minutes. EMS arrived and found her apneic with a pulse and she was intubated in the field.  No further history or ROS is available. CXR noted.  ADMIT ICU.

## 2022-11-30 NOTE — CONSULT NOTE ADULT - ATTENDING COMMENTS
The patient (real name Angelica) was seen & examined in the ED on her stretcher.  We held her sedation and were able to elicit mental status following commands and moving all extremities despite findings of anoxic injury on CT.  She was tolerating minimal ventilator support without elevated peak/plateau pressure and no signs of wheezing or bronchospasm on exam.  It is unclear what may have led to her respiratory arrest at this time, will continue with further workup including metabolic panels and drug screening, as well as additional history if available.  I agree with the fellow note, with the exceptions listed in my attestation above.  The remainder of impression and plan per fellow note.

## 2022-11-30 NOTE — H&P ADULT - ASSESSMENT
IMPRESSION:  Acute hypoxemic respiratory failure s/p intubation by ems  Cardiac arrest?  Severe asthma exacerbation?      PLAN:    CNS: wean propofol, and versed, start Precedex and fentanyl, CT head reviewed with findings suggestive of anoxic brain injury, neurocrit eval      HEENT: Oral care, ETT care    PULMONARY:  HOB @ 45 degrees.  Vent changes as follows: keep  (~6ml/kg), lower RR to 16, lower Fio2 to 40%, repeat ABG in 1 hr, solumedrol 60 Q8 hr, albuterol nebs q 4 hr     CARDIOVASCULAR: MAP adequate, avoid volume overload, ECHO    GI: GI prophylaxis.  Feeding.  Bowel regimen, Ct abd non con     RENAL:  Follow up lytes.  Correct as needed    INFECTIOUS DISEASE: Follow up cultures, Full RVP    HEMATOLOGICAL:  DVT prophylaxis.    ENDOCRINE:  Follow up FS.  Insulin protocol if needed.    MUSCULOSKELETAL: bedrest    MICU

## 2022-11-30 NOTE — ED PROVIDER NOTE - PHYSICAL EXAMINATION
CONSTITUTIONAL: ill appearing. comatose.   EYES: pupils 4mm b/l with light reflex   ENT: ET tube in place  CARDIOVASCULAR: + tachycardic. no murmur   RESPIRATORY: + wheezing b/l.    GI/: round soft obese abdomen. no rebound  MS: No evidence of trauma or deformity.   SKIN: good cap refill to all extremities.   NEURO/PSYCH: comatose

## 2022-11-30 NOTE — ED PROVIDER NOTE - CARE PLAN
Principal Discharge DX:	Cardiac arrest  Secondary Diagnosis:	Respiratory failure  Secondary Diagnosis:	Anoxic brain injury   1

## 2022-11-30 NOTE — H&P ADULT - NSHPLABSRESULTS_GEN_ALL_CORE
13.7   21.38 )-----------( 451      ( 30 Nov 2022 06:20 )             43.1       11-30    134<L>  |  99  |  10  ----------------------------<  305<H>  4.5   |  20  |  0.9    Ca    8.4      30 Nov 2022 06:20    TPro  6.6  /  Alb  4.1  /  TBili  <0.2  /  DBili  x   /  AST  23  /  ALT  14  /  AlkPhos  92  11-30          ABG - ( 30 Nov 2022 13:09 )  pH, Arterial: 7.35  pH, Blood: x     /  pCO2: 42    /  pO2: 96    / HCO3: 23    / Base Excess: -2.4  /  SaO2: 98.9                PT/INR - ( 30 Nov 2022 06:20 )   PT: 10.30 sec;   INR: 0.91 ratio         PTT - ( 30 Nov 2022 06:20 )  PTT:24.4 sec

## 2022-11-30 NOTE — ED ADULT NURSE REASSESSMENT NOTE - NS ED NURSE REASSESS COMMENT FT1
pt is sedated, on ventilator, respirations easy and regular, skin is warm, dry, and normal in color, pt has midazolam, precedex, and fentanyl infusing continuously for maintenance of sedation

## 2022-12-01 LAB
ALBUMIN SERPL ELPH-MCNC: 4.1 G/DL — SIGNIFICANT CHANGE UP (ref 3.5–5.2)
ALP SERPL-CCNC: 83 U/L — SIGNIFICANT CHANGE UP (ref 30–115)
ALT FLD-CCNC: 13 U/L — SIGNIFICANT CHANGE UP (ref 0–41)
ANION GAP SERPL CALC-SCNC: 12 MMOL/L — SIGNIFICANT CHANGE UP (ref 7–14)
AST SERPL-CCNC: 18 U/L — SIGNIFICANT CHANGE UP (ref 0–41)
BASOPHILS # BLD AUTO: 0.03 K/UL — SIGNIFICANT CHANGE UP (ref 0–0.2)
BASOPHILS NFR BLD AUTO: 0.2 % — SIGNIFICANT CHANGE UP (ref 0–1)
BILIRUB SERPL-MCNC: 0.4 MG/DL — SIGNIFICANT CHANGE UP (ref 0.2–1.2)
BUN SERPL-MCNC: 12 MG/DL — SIGNIFICANT CHANGE UP (ref 10–20)
CALCIUM SERPL-MCNC: 8.6 MG/DL — SIGNIFICANT CHANGE UP (ref 8.4–10.5)
CHLORIDE SERPL-SCNC: 102 MMOL/L — SIGNIFICANT CHANGE UP (ref 98–110)
CO2 SERPL-SCNC: 23 MMOL/L — SIGNIFICANT CHANGE UP (ref 17–32)
CREAT SERPL-MCNC: 0.7 MG/DL — SIGNIFICANT CHANGE UP (ref 0.7–1.5)
CULTURE RESULTS: NO GROWTH — SIGNIFICANT CHANGE UP
EGFR: 105 ML/MIN/1.73M2 — SIGNIFICANT CHANGE UP
EOSINOPHIL # BLD AUTO: 0 K/UL — SIGNIFICANT CHANGE UP (ref 0–0.7)
EOSINOPHIL NFR BLD AUTO: 0 % — SIGNIFICANT CHANGE UP (ref 0–8)
GAS PNL BLDA: SIGNIFICANT CHANGE UP
GLUCOSE BLDC GLUCOMTR-MCNC: 127 MG/DL — HIGH (ref 70–99)
GLUCOSE SERPL-MCNC: 152 MG/DL — HIGH (ref 70–99)
HCT VFR BLD CALC: 42.5 % — SIGNIFICANT CHANGE UP (ref 37–47)
HGB BLD-MCNC: 13.6 G/DL — SIGNIFICANT CHANGE UP (ref 12–16)
IMM GRANULOCYTES NFR BLD AUTO: 0.7 % — HIGH (ref 0.1–0.3)
LACTATE SERPL-SCNC: 2.8 MMOL/L — HIGH (ref 0.7–2)
LYMPHOCYTES # BLD AUTO: 0.95 K/UL — LOW (ref 1.2–3.4)
LYMPHOCYTES # BLD AUTO: 4.9 % — LOW (ref 20.5–51.1)
MAGNESIUM SERPL-MCNC: 2.5 MG/DL — HIGH (ref 1.8–2.4)
MCHC RBC-ENTMCNC: 28.8 PG — SIGNIFICANT CHANGE UP (ref 27–31)
MCHC RBC-ENTMCNC: 32 G/DL — SIGNIFICANT CHANGE UP (ref 32–37)
MCV RBC AUTO: 89.9 FL — SIGNIFICANT CHANGE UP (ref 81–99)
MONOCYTES # BLD AUTO: 0.66 K/UL — HIGH (ref 0.1–0.6)
MONOCYTES NFR BLD AUTO: 3.4 % — SIGNIFICANT CHANGE UP (ref 1.7–9.3)
MRSA PCR RESULT.: NEGATIVE — SIGNIFICANT CHANGE UP
NEUTROPHILS # BLD AUTO: 17.76 K/UL — HIGH (ref 1.4–6.5)
NEUTROPHILS NFR BLD AUTO: 90.8 % — HIGH (ref 42.2–75.2)
NRBC # BLD: 0 /100 WBCS — SIGNIFICANT CHANGE UP (ref 0–0)
PLATELET # BLD AUTO: 301 K/UL — SIGNIFICANT CHANGE UP (ref 130–400)
POTASSIUM SERPL-MCNC: 4.8 MMOL/L — SIGNIFICANT CHANGE UP (ref 3.5–5)
POTASSIUM SERPL-SCNC: 4.8 MMOL/L — SIGNIFICANT CHANGE UP (ref 3.5–5)
PROT SERPL-MCNC: 6.5 G/DL — SIGNIFICANT CHANGE UP (ref 6–8)
RBC # BLD: 4.73 M/UL — SIGNIFICANT CHANGE UP (ref 4.2–5.4)
RBC # FLD: 13.3 % — SIGNIFICANT CHANGE UP (ref 11.5–14.5)
SODIUM SERPL-SCNC: 137 MMOL/L — SIGNIFICANT CHANGE UP (ref 135–146)
SPECIMEN SOURCE: SIGNIFICANT CHANGE UP
TROPONIN T SERPL-MCNC: <0.01 NG/ML — SIGNIFICANT CHANGE UP
WBC # BLD: 19.53 K/UL — HIGH (ref 4.8–10.8)
WBC # FLD AUTO: 19.53 K/UL — HIGH (ref 4.8–10.8)

## 2022-12-01 PROCEDURE — 74150 CT ABDOMEN W/O CONTRAST: CPT | Mod: 26

## 2022-12-01 PROCEDURE — 71045 X-RAY EXAM CHEST 1 VIEW: CPT | Mod: 26

## 2022-12-01 PROCEDURE — 93306 TTE W/DOPPLER COMPLETE: CPT | Mod: 26

## 2022-12-01 PROCEDURE — 99291 CRITICAL CARE FIRST HOUR: CPT | Mod: GC

## 2022-12-01 PROCEDURE — 99291 CRITICAL CARE FIRST HOUR: CPT

## 2022-12-01 RX ORDER — CHLORHEXIDINE GLUCONATE 213 G/1000ML
15 SOLUTION TOPICAL EVERY 12 HOURS
Refills: 0 | Status: DISCONTINUED | OUTPATIENT
Start: 2022-12-01 | End: 2022-12-01

## 2022-12-01 RX ORDER — CHLORHEXIDINE GLUCONATE 213 G/1000ML
1 SOLUTION TOPICAL
Refills: 0 | Status: DISCONTINUED | OUTPATIENT
Start: 2022-12-01 | End: 2022-12-03

## 2022-12-01 RX ORDER — CHLORHEXIDINE GLUCONATE 213 G/1000ML
15 SOLUTION TOPICAL EVERY 12 HOURS
Refills: 0 | Status: DISCONTINUED | OUTPATIENT
Start: 2022-12-01 | End: 2022-12-03

## 2022-12-01 RX ORDER — DEXMEDETOMIDINE HYDROCHLORIDE IN 0.9% SODIUM CHLORIDE 4 UG/ML
0.2 INJECTION INTRAVENOUS
Qty: 400 | Refills: 0 | Status: DISCONTINUED | OUTPATIENT
Start: 2022-12-01 | End: 2022-12-02

## 2022-12-01 RX ADMIN — CHLORHEXIDINE GLUCONATE 15 MILLILITER(S): 213 SOLUTION TOPICAL at 18:30

## 2022-12-01 RX ADMIN — Medication 60 MILLIGRAM(S): at 05:32

## 2022-12-01 RX ADMIN — CHLORHEXIDINE GLUCONATE 15 MILLILITER(S): 213 SOLUTION TOPICAL at 05:33

## 2022-12-01 RX ADMIN — Medication 60 MILLIGRAM(S): at 03:34

## 2022-12-01 RX ADMIN — CHLORHEXIDINE GLUCONATE 1 APPLICATION(S): 213 SOLUTION TOPICAL at 05:33

## 2022-12-01 RX ADMIN — Medication 60 MILLIGRAM(S): at 13:16

## 2022-12-01 RX ADMIN — Medication 60 MILLIGRAM(S): at 22:24

## 2022-12-01 RX ADMIN — ENOXAPARIN SODIUM 40 MILLIGRAM(S): 100 INJECTION SUBCUTANEOUS at 05:31

## 2022-12-01 NOTE — PROGRESS NOTE ADULT - SUBJECTIVE AND OBJECTIVE BOX
Patient is a 53y old  Female who presents with a chief complaint of       Over Night Events:        ROS:     Unable to assess ROS: patient intubated.        PHYSICAL EXAM    ICU Vital Signs Last 24 Hrs  T(C): 35.9 (01 Dec 2022 04:00), Max: 36.8 (2022 16:00)  T(F): 96.6 (01 Dec 2022 04:00), Max: 98.2 (2022 16:00)  HR: 65 (01 Dec 2022 06:00) (65 - 118)  BP: 102/60 (01 Dec 2022 06:00) (94/68 - 118/79)  BP(mean): 76 (01 Dec 2022 06:00) (71 - 83)  ABP: --  ABP(mean): --  RR: 16 (01 Dec 2022 06:00) (14 - 20)  SpO2: 100% (01 Dec 2022 06:00) (99% - 100%)    O2 Parameters below as of 01 Dec 2022 06:00  Patient On (Oxygen Delivery Method): ventilator    O2 Concentration (%): 40      Constitutional: no acute distress, well nourished well developed  Neuro: moving all 4 limbs spontaneously.  Sedated.  HEENT: NCAT, anicteric, ETT in place  Neck: no visible lymphadenopathy or goiter  Pulm: synchronous with ventilator, coarse bilateral breath sounds anteriorly  Cardiac: extremities appear pink and well-perfused.  regular rhythm and rate, no murmur detected  Abdomen: non-distended  Extremities: trace dependent edema  Skin: no visible rashes or lesions      22 @ 07:01  -  22 @ 07:00  --------------------------------------------------------  IN:    Dexmedetomidine: 106.8 mL    FentaNYL: 27.4 mL    IV PiggyBack: 250 mL    Midazolam: 15 mL  Total IN: 399.2 mL    OUT:    Indwelling Catheter - Urethral (mL): 1575 mL  Total OUT: 1575 mL    Total NET: -1175.8 mL          LABS:                            13.7   21.38 )-----------( 451      ( 2022 06:20 )             43.1                                                   134<L>  |  99  |  10  ----------------------------<  305<H>  4.5   |  20  |  0.9    Ca    8.4      2022 06:20    TPro  6.6  /  Alb  4.1  /  TBili  <0.2  /  DBili  x   /  AST  23  /  ALT  14  /  AlkPhos  92        PT/INR - ( 2022 06:20 )   PT: 10.30 sec;   INR: 0.91 ratio         PTT - ( 2022 06:20 )  PTT:24.4 sec                                       Urinalysis Basic - ( 2022 10:21 )    Color: Light Yellow / Appearance: Clear / S.044 / pH: x  Gluc: x / Ketone: Negative  / Bili: Negative / Urobili: <2 mg/dL   Blood: x / Protein: Trace / Nitrite: Negative   Leuk Esterase: Negative / RBC: x / WBC x   Sq Epi: x / Non Sq Epi: x / Bacteria: x        CARDIAC MARKERS ( 2022 06:20 )  x     / <0.01 ng/mL / x     / x     / x                                                LIVER FUNCTIONS - ( 2022 06:20 )  Alb: 4.1 g/dL / Pro: 6.6 g/dL / ALK PHOS: 92 U/L / ALT: 14 U/L / AST: 23 U/L / GGT: x                                                                                               Mode: AC/ CMV (Assist Control/ Continuous Mandatory Ventilation)  RR (machine): 16  TV (machine): 450  FiO2: 40  PEEP: 8  ITime: 1  MAP: 16  PIP: 29                                      ABG - ( 2022 13:09 )  pH, Arterial: 7.35  pH, Blood: x     /  pCO2: 42    /  pO2: 96    / HCO3: 23    / Base Excess: -2.4  /  SaO2: 98.9                MEDICATIONS  (STANDING):  albuterol    90 MICROgram(s) HFA Inhaler 2 Puff(s) Inhalation Once  chlorhexidine 0.12% Liquid 15 milliLiter(s) Oral Mucosa every 12 hours  chlorhexidine 0.12% Liquid 15 milliLiter(s) Oral Mucosa every 12 hours  chlorhexidine 2% Cloths 1 Application(s) Topical <User Schedule>  dexMEDEtomidine Infusion 0.2 MICROgram(s)/kG/Hr (5 mL/Hr) IV Continuous <Continuous>  enoxaparin Injectable 40 milliGRAM(s) SubCutaneous every 24 hours  fentaNYL   Infusion. 0.5 MICROgram(s)/kG/Hr (5 mL/Hr) IV Continuous <Continuous>  levETIRAcetam  IVPB 1500 milliGRAM(s) IV Intermittent every 12 hours  methylPREDNISolone sodium succinate Injectable 60 milliGRAM(s) IV Push every 8 hours  midazolam Infusion 0.02 mG/kG/Hr (2 mL/Hr) IV Continuous <Continuous>  propofol Infusion 10 MICROgram(s)/kG/Min (6 mL/Hr) IV Continuous <Continuous>    MEDICATIONS  (PRN):      New X-rays reviewed:       ECHO reviewed    CXR interpreted by me:     CXR compared with , images and reads reviewed, by my read stable clear bilateral lung fields

## 2022-12-01 NOTE — PROGRESS NOTE ADULT - SUBJECTIVE AND OBJECTIVE BOX
Patient is a 53y old  Female who presents with a chief complaint of   HPI:  Pt is (reportedly) a 30 y/o F with autism and asthma? who is BIBA after respiratory (cardiac?) arrest. Reportedly the pt's name is Marysol Carlton and lives at [address] [place] [address]. Pt was intubated in the field by EMS. Collateral history obtained from multiple sources. The ED reports that pt received one push of epi in the field, they are unsure if the patient was actually in cardiac arrest or not when ems arrived at the scene, unsure if pt had undergone any chest compressions. More hx obtained from pt's neighbor Ms. Troncoso ([phone], this number was obtained from sue elliott-see below). Ms. Troncoso states that early this morning the pt was frantically knocking on her door, when Ms troncoso opened the door, the pt she was not wearing any clothes and was in severe respiratory distress saying that she couldn't breathe. Ms. Troncoso states that the pt started turning blue and collapsed on the floor, she called ems, she states that she believes that she was not breathing for at least 5 minutes before ems arrived. When ems arrived they intubated her and "gave her some medication through the arm" (presumably epi?). Ms. troncoso is not sure if they actually did any chest compressions on her as she was crying and distraught and was not entirely able to paying attention. Ms. Troncoso states that the only family she is aware of that the pt has is a cousin and a brother named Julian carlton (does not know his number), she provides the office number for the apartment complex where they live and states that they might have more info: [phone]. Ms. Troncoso is unsure of any other of the pt's pmh or is she uses any substances and states that she generally "stays out of her business." Additional collateral history obtained from Sue Elliott ([phone]) who had been in the ED earlier in the day and provided the ED with her number. Ms. Rogers states that she is a family friend of the pt and describes herself as a "grandmother" to the pt's 6 year old daughter (who is also autistic). Ms. Rogers states that the pt is autistic and believes she has asthma, does not know if she takes any medications and is unsure if she has any other pmh, she does not know the numbers to the pt's brother or cousin. Ms. Rogers provided the phone number for the pt's neighbor above.     In the ED, pt given fluids and solumedrol, started on a propofol and versed drip  Vitals: Vital Signs Last 24 Hrs  T(C): 36.6 (2022 12:47), Max: 36.6 (:47)  T(F): 97.9 (:47), Max: 97.9 (:47)  HR: 82 (:) (78 - 123)  BP: 108/60 (:47) (94/68 - 147/71)  BP(mean): 77 (2022 12:00) (76 - 99)  RR: 20 (:47) (14 - 20)  SpO2: 99% (:47) (99% - 100%)    Parameters below as of :47  Patient On (Oxygen Delivery Method): ventilator    Labs: remarkbale for wbc 21    Imaging:   < from: CT Angio Chest PE Protocol w/ IV Cont (22 @ 09:02) >  IMPRESSION:    1.  No evidence of a pulmonary embolism.    2.  Endotracheal tube is in satisfactory position. Minimal secretions in   the distal trachea and right mainstem bronchus.    < end of copied text >  < from: CT Head No Cont (22 @ 09:01) >  IMPRESSION:    Diffuse effacement of the bilateral cerebral/cerebellar sulci and   scattered blurring of gray-white matter differentiation. The finding   could reflect global hypoxic/ischemic injury.    No intracranial hemorrhage or midline shift.    < end of copied text >  < from: Xray Chest 1 View-PORTABLE IMMEDIATE (Xray Chest 1 View-PORTABLE IMMEDIATE .) (22 @ 06:53) >  Impression:    No radiographic evidence of acute cardiopulmonary disease.    ETT.    < end of copied text >            ?52 yo female hx of asthma BIBA 2/2 respiratory arrest. as per EMS, patient went to her neighbor and knocking on the door and asking for help. Patient then collapsed. Neighbor said patient had hx of asthma. EMS intubated patient on scene and brought patient to ED for evaluation. (2022 13:28)       INTERVAL HPI/OVERNIGHT EVENTS:   No overnight events   Afebrile, hemodynamically stable     Subjective:    ICU Vital Signs Last 24 Hrs  T(C): 35.9 (01 Dec 2022 04:00), Max: 36.8 (2022 16:00)  T(F): 96.6 (01 Dec 2022 04:00), Max: 98.2 (2022 16:00)  HR: 67 (01 Dec 2022 05:00) (67 - 118)  BP: 96/57 (01 Dec 2022 05:00) (94/68 - 147/71)  BP(mean): 71 (01 Dec 2022 05:) (71 - 99)  ABP: --  ABP(mean): --  RR: 16 (01 Dec 2022 05:00) (14 - 20)  SpO2: 100% (01 Dec 2022 05:00) (99% - 100%)    O2 Parameters below as of 01 Dec 2022 05:00  Patient On (Oxygen Delivery Method): ventilator    O2 Concentration (%): 40      I&O's Summary    2022 07:01  -  01 Dec 2022 06:21  --------------------------------------------------------  IN: 350.6 mL / OUT: 1475 mL / NET: -1124.4 mL      Mode: AC/ CMV (Assist Control/ Continuous Mandatory Ventilation)  RR (machine): 16  TV (machine): 450  FiO2: 40  PEEP: 8  ITime: 1  MAP: 16  PIP: 29      Daily Height in cm: 165.1 (01 Dec 2022 04:00)    Daily     Adult Advanced Hemodynamics Last 24 Hrs  CVP(mm Hg): --  CVP(cm H2O): --  CO: --  CI: --  PA: --  PA(mean): --  PCWP: --  SVR: --  SVRI: --  PVR: --  PVRI: --    EKG/Telemetry Events:    MEDICATIONS  (STANDING):  albuterol    90 MICROgram(s) HFA Inhaler 2 Puff(s) Inhalation Once  chlorhexidine 0.12% Liquid 15 milliLiter(s) Oral Mucosa every 12 hours  chlorhexidine 0.12% Liquid 15 milliLiter(s) Oral Mucosa every 12 hours  chlorhexidine 2% Cloths 1 Application(s) Topical <User Schedule>  dexMEDEtomidine Infusion 0.2 MICROgram(s)/kG/Hr (5 mL/Hr) IV Continuous <Continuous>  enoxaparin Injectable 40 milliGRAM(s) SubCutaneous every 24 hours  fentaNYL   Infusion. 0.5 MICROgram(s)/kG/Hr (5 mL/Hr) IV Continuous <Continuous>  levETIRAcetam  IVPB 1500 milliGRAM(s) IV Intermittent every 12 hours  methylPREDNISolone sodium succinate Injectable 60 milliGRAM(s) IV Push every 8 hours  midazolam Infusion 0.02 mG/kG/Hr (2 mL/Hr) IV Continuous <Continuous>  propofol Infusion 10 MICROgram(s)/kG/Min (6 mL/Hr) IV Continuous <Continuous>    MEDICATIONS  (PRN):      PHYSICAL EXAM:  GENERAL:   HEAD:  Atraumatic, Normocephalic  EYES: EOMI, PERRLA, conjunctiva and sclera clear  NECK: Supple, No JVD, Normal thyroid, no enlarged nodes  NERVOUS SYSTEM:  Alert & Awake.   CHEST/LUNG: B/L good air entry; No rales, rhonchi, or wheezing  HEART: S1S2 normal, no S3, Regular rate and rhythm; No murmurs  ABDOMEN: Soft, Nontender, Nondistended; Bowel sounds present  EXTREMITIES:  2+ Peripheral Pulses, No clubbing, cyanosis, or edema  LYMPH: No lymphadenopathy noted  SKIN: No rashes or lesions    LABS:                        13.7   21.38 )-----------( 451      ( 2022 06:20 )             43.1     1130    134<L>  |  99  |  10  ----------------------------<  305<H>  4.5   |  20  |  0.9    Ca    8.4      2022 06:20    TPro  6.6  /  Alb  4.1  /  TBili  <0.2  /  DBili  x   /  AST  23  /  ALT  14  /  AlkPhos  92      LIVER FUNCTIONS - ( 2022 06:20 )  Alb: 4.1 g/dL / Pro: 6.6 g/dL / ALK PHOS: 92 U/L / ALT: 14 U/L / AST: 23 U/L / GGT: x           PT/INR - ( 2022 06:20 )   PT: 10.30 sec;   INR: 0.91 ratio         PTT - ( 2022 06:20 )  PTT:24.4 sec  CAPILLARY BLOOD GLUCOSE      POCT Blood Glucose.: 127 mg/dL (01 Dec 2022 04:00)  POCT Blood Glucose.: 136 mg/dL (2022 23:51)    ABG - ( 2022 13:09 )  pH, Arterial: 7.35  pH, Blood: x     /  pCO2: 42    /  pO2: 96    / HCO3: 23    / Base Excess: -2.4  /  SaO2: 98.9                CARDIAC MARKERS ( 2022 06:20 )  x     / <0.01 ng/mL / x     / x     / x          Urinalysis Basic - ( 2022 10:21 )    Color: Light Yellow / Appearance: Clear / S.044 / pH: x  Gluc: x / Ketone: Negative  / Bili: Negative / Urobili: <2 mg/dL   Blood: x / Protein: Trace / Nitrite: Negative   Leuk Esterase: Negative / RBC: x / WBC x   Sq Epi: x / Non Sq Epi: x / Bacteria: x          RADIOLOGY & ADDITIONAL TESTS:  CXR:        Care Discussed with Consultants/Other Providers [ x] YES  [ ] NO           Patient is a 53y old  Female who presents with a chief complaint of   HPI:  Pt is (reportedly) a 30 y/o F with autism and asthma? who is BIBA after respiratory (cardiac?) arrest. Reportedly the pt's name is Marysol Carlton and lives at [address] [place] [address]. Pt was intubated in the field by EMS. Collateral history obtained from multiple sources. The ED reports that pt received one push of epi in the field, they are unsure if the patient was actually in cardiac arrest or not when ems arrived at the scene, unsure if pt had undergone any chest compressions. More hx obtained from pt's neighbor Ms. Troncoso ([phone], this number was obtained from sue elliott-see below). Ms. Troncoso states that early this morning the pt was frantically knocking on her door, when Ms troncoso opened the door, the pt she was not wearing any clothes and was in severe respiratory distress saying that she couldn't breathe. Ms. Troncoso states that the pt started turning blue and collapsed on the floor, she called ems, she states that she believes that she was not breathing for at least 5 minutes before ems arrived. When ems arrived they intubated her and "gave her some medication through the arm" (presumably epi?). Ms. troncoso is not sure if they actually did any chest compressions on her as she was crying and distraught and was not entirely able to paying attention. Ms. Troncoso states that the only family she is aware of that the pt has is a cousin and a brother named Julian carlton (does not know his number), she provides the office number for the apartment complex where they live and states that they might have more info: [phone]. Ms. Troncoso is unsure of any other of the pt's pmh or is she uses any substances and states that she generally "stays out of her business." Additional collateral history obtained from Sue Elliott ([phone]) who had been in the ED earlier in the day and provided the ED with her number. Ms. Rogers states that she is a family friend of the pt and describes herself as a "grandmother" to the pt's 6 year old daughter (who is also autistic). Ms. Rogers states that the pt is autistic and believes she has asthma, does not know if she takes any medications and is unsure if she has any other pmh, she does not know the numbers to the pt's brother or cousin. Ms. Rogers provided the phone number for the pt's neighbor above.     In the ED, pt given fluids and solumedrol, started on a propofol and versed drip  Vitals: Vital Signs Last 24 Hrs  T(C): 36.6 (2022 12:47), Max: 36.6 (:47)  T(F): 97.9 (:47), Max: 97.9 (:47)  HR: 82 (:) (78 - 123)  BP: 108/60 (:47) (94/68 - 147/71)  BP(mean): 77 (2022 12:00) (76 - 99)  RR: 20 (:47) (14 - 20)  SpO2: 99% (:47) (99% - 100%)    Parameters below as of :47  Patient On (Oxygen Delivery Method): ventilator    Labs: remarkbale for wbc 21    Imaging:   < from: CT Angio Chest PE Protocol w/ IV Cont (22 @ 09:02) >  IMPRESSION:    1.  No evidence of a pulmonary embolism.    2.  Endotracheal tube is in satisfactory position. Minimal secretions in   the distal trachea and right mainstem bronchus.    < end of copied text >  < from: CT Head No Cont (22 @ 09:01) >  IMPRESSION:    Diffuse effacement of the bilateral cerebral/cerebellar sulci and   scattered blurring of gray-white matter differentiation. The finding   could reflect global hypoxic/ischemic injury.    No intracranial hemorrhage or midline shift.    < end of copied text >  < from: Xray Chest 1 View-PORTABLE IMMEDIATE (Xray Chest 1 View-PORTABLE IMMEDIATE .) (22 @ 06:53) >  Impression:    No radiographic evidence of acute cardiopulmonary disease.    ETT.    < end of copied text >            ?52 yo female hx of asthma BIBA 2/2 respiratory arrest. as per EMS, patient went to her neighbor and knocking on the door and asking for help. Patient then collapsed. Neighbor said patient had hx of asthma. EMS intubated patient on scene and brought patient to ED for evaluation. (2022 13:28)       INTERVAL HPI/OVERNIGHT EVENTS:   No overnight events   Afebrile, hemodynamically stable     Subjective:    ICU Vital Signs Last 24 Hrs  T(C): 35.9 (01 Dec 2022 04:00), Max: 36.8 (2022 16:00)  T(F): 96.6 (01 Dec 2022 04:00), Max: 98.2 (2022 16:00)  HR: 67 (01 Dec 2022 05:00) (67 - 118)  BP: 96/57 (01 Dec 2022 05:00) (94/68 - 147/71)  BP(mean): 71 (01 Dec 2022 05:) (71 - 99)  ABP: --  ABP(mean): --  RR: 16 (01 Dec 2022 05:00) (14 - 20)  SpO2: 100% (01 Dec 2022 05:00) (99% - 100%)    O2 Parameters below as of 01 Dec 2022 05:00  Patient On (Oxygen Delivery Method): ventilator    O2 Concentration (%): 40      I&O's Summary    2022 07:01  -  01 Dec 2022 06:21  --------------------------------------------------------  IN: 350.6 mL / OUT: 1475 mL / NET: -1124.4 mL      Mode: AC/ CMV (Assist Control/ Continuous Mandatory Ventilation)  RR (machine): 16  TV (machine): 450  FiO2: 40  PEEP: 8  ITime: 1  MAP: 16  PIP: 29      Daily Height in cm: 165.1 (01 Dec 2022 04:00)    Daily     Adult Advanced Hemodynamics Last 24 Hrs  CVP(mm Hg): --  CVP(cm H2O): --  CO: --  CI: --  PA: --  PA(mean): --  PCWP: --  SVR: --  SVRI: --  PVR: --  PVRI: --    EKG/Telemetry Events:    MEDICATIONS  (STANDING):  albuterol    90 MICROgram(s) HFA Inhaler 2 Puff(s) Inhalation Once  chlorhexidine 0.12% Liquid 15 milliLiter(s) Oral Mucosa every 12 hours  chlorhexidine 0.12% Liquid 15 milliLiter(s) Oral Mucosa every 12 hours  chlorhexidine 2% Cloths 1 Application(s) Topical <User Schedule>  dexMEDEtomidine Infusion 0.2 MICROgram(s)/kG/Hr (5 mL/Hr) IV Continuous <Continuous>  enoxaparin Injectable 40 milliGRAM(s) SubCutaneous every 24 hours  fentaNYL   Infusion. 0.5 MICROgram(s)/kG/Hr (5 mL/Hr) IV Continuous <Continuous>  levETIRAcetam  IVPB 1500 milliGRAM(s) IV Intermittent every 12 hours  methylPREDNISolone sodium succinate Injectable 60 milliGRAM(s) IV Push every 8 hours  midazolam Infusion 0.02 mG/kG/Hr (2 mL/Hr) IV Continuous <Continuous>  propofol Infusion 10 MICROgram(s)/kG/Min (6 mL/Hr) IV Continuous <Continuous>    MEDICATIONS  (PRN):      PHYSICAL EXAM:  GENERAL: sedated, intubated  HEAD:  Atraumatic, Normocephalic  EYES: pupils pinpoint, equal round, reactive, conjunctiva and sclera clear  NECK: Supple, No JVD, Normal thyroid, no enlarged nodes  CHEST/LUNG: B/L good air entry; No rales, rhonchi, or wheezing, no palpable crepitus in the chest wall  HEART: S1S2 normal, no S3, Regular rate and rhythm; No murmurs  ABDOMEN: Soft, Nontender, Nondistended; Bowel sounds present  EXTREMITIES:  2+ Peripheral Pulses, No clubbing, cyanosis, or edema  LYMPH: No lymphadenopathy noted  SKIN: No rashes or lesions  NERVOUS SYSTEM:  Alert & Awake. able to open eyes and follow commands    LABS:                        13.7   21.38 )-----------( 451      ( 2022 06:20 )             43.1     11-30    134<L>  |  99  |  10  ----------------------------<  305<H>  4.5   |  20  |  0.9    Ca    8.4      2022 06:20    TPro  6.6  /  Alb  4.1  /  TBili  <0.2  /  DBili  x   /  AST  23  /  ALT  14  /  AlkPhos  92  11-30    LIVER FUNCTIONS - ( 2022 06:20 )  Alb: 4.1 g/dL / Pro: 6.6 g/dL / ALK PHOS: 92 U/L / ALT: 14 U/L / AST: 23 U/L / GGT: x           PT/INR - ( 2022 06:20 )   PT: 10.30 sec;   INR: 0.91 ratio         PTT - ( 2022 06:20 )  PTT:24.4 sec  CAPILLARY BLOOD GLUCOSE      POCT Blood Glucose.: 127 mg/dL (01 Dec 2022 04:00)  POCT Blood Glucose.: 136 mg/dL (2022 23:51)    ABG - ( 2022 13:09 )  pH, Arterial: 7.35  pH, Blood: x     /  pCO2: 42    /  pO2: 96    / HCO3: 23    / Base Excess: -2.4  /  SaO2: 98.9                CARDIAC MARKERS ( 2022 06:20 )  x     / <0.01 ng/mL / x     / x     / x          Urinalysis Basic - ( 2022 10:21 )    Color: Light Yellow / Appearance: Clear / S.044 / pH: x  Gluc: x / Ketone: Negative  / Bili: Negative / Urobili: <2 mg/dL   Blood: x / Protein: Trace / Nitrite: Negative   Leuk Esterase: Negative / RBC: x / WBC x   Sq Epi: x / Non Sq Epi: x / Bacteria: x          RADIOLOGY & ADDITIONAL TESTS:  CXR:        Care Discussed with Consultants/Other Providers [ x] YES  [ ] NO

## 2022-12-01 NOTE — CONSULT NOTE ADULT - NS PANP COMMENT GEN_ALL_CORE FT
I have personally seen and examined this patient on 12/1.  I have fully participated in the care of this patient.  I have reviewed all pertinent clinical information, including history, physical exam, plan and note.  On my exam on 12/1 patient was intubated and sedated on Precedex but was responsive to tactile stimuli, opening her eyes and moving extremities against gravity. Cranial nerves intact. Clinical picture is not consistent with brain edema. Recommend to hold off on KEPPRA. Repeat CT head and only obtain REEG. Prepare for extubation.   I have reviewed all pertinent clinical information and reviewed all relevant imaging and diagnostic studies personally.  Recommendations as above.  Agree with above assessment except as noted.

## 2022-12-01 NOTE — PROGRESS NOTE ADULT - ASSESSMENT
IMPRESSION:  Acute hypoxemic respiratory failure  Severe asthma exacerbation?  Respiratory arrest, unclear cause  anoxic brain injury      PLAN:    CNS/Psych:   SAT today  Continue siezure ppx w/ Keppra  MRI brain pending  Appreciate neuro reccs  Identify patient in system, Angelica Carlton, ensure patient's child is in safe custody, discuss w/ social work    HEENT: Oral care, ETT care  ETT day #1    PULMONARY:  HOB @ 45 degrees.    SBT today with intent to extubate  solumedrol 60 Q8 hr, albuterol nebs q 4 hr     CARDIOVASCULAR: MAP adequate, avoid volume overload, ECHO    GI: GI prophylaxis.  Start TF if not extubated.  Bowel regimen     RENAL:  Follow up lytes.  Correct as needed    INFECTIOUS DISEASE: Follow up cultures, Full RVP,     HEMATOLOGICAL:  DVT prophylaxis.    ENDOCRINE:  Follow up FS.  Insulin protocol if needed.    MUSCULOSKELETAL: PT/OT when cooperative    MICU

## 2022-12-01 NOTE — PATIENT PROFILE ADULT - FALL HARM RISK - HARM RISK INTERVENTIONS
Assistance with ambulation/Assistance OOB with selected safe patient handling equipment/Communicate Risk of Fall with Harm to all staff/Discuss with provider need for PT consult/Monitor gait and stability/Provide patient with walking aids - walker, cane, crutches/Reinforce activity limits and safety measures with patient and family/Review medications for side effects contributing to fall risk/Sit up slowly, dangle for a short time, stand at bedside before walking/Tailored Fall Risk Interventions/Toileting schedule using arm’s reach rule for commode and bathroom/Visual Cue: Yellow wristband and red socks/Bed in lowest position, wheels locked, appropriate side rails in place/Call bell, personal items and telephone in reach/Instruct patient to call for assistance before getting out of bed or chair/Non-slip footwear when patient is out of bed/[place] to call system/Physically safe environment - no spills, clutter or unnecessary equipment/Purposeful Proactive Rounding/Room/bathroom lighting operational, light cord in reach

## 2022-12-01 NOTE — CONSULT NOTE ADULT - SUBJECTIVE AND OBJECTIVE BOX
Neurology Consult    Patient is a 53y old  Female who presents with a chief complaint of respiratory cardiac arrest     HPI:  Pt is (reportedly) a 30 y/o F with autism and asthma? who is BIBA after respiratory (cardiac?) arrest. Reportedly the pt's name is Marysol Carlton and lives at [address] [place] [address]. Pt was intubated in the field by EMS. Collateral history obtained from multiple sources. The ED reports that pt received one push of epi in the field, they are unsure if the patient was actually in cardiac arrest or not when ems arrived at the scene, unsure if pt had undergone any chest compressions. More hx obtained from pt's neighbor Ms. Troncoso ([phone], this number was obtained from adam elliott-see below). Ms. Troncoso states that early this morning the pt was frantically knocking on her door, when Ms troncoso opened the door, the pt she was not wearing any clothes and was in severe respiratory distress saying that she couldn't breathe. Ms. Troncoso states that the pt started turning blue and collapsed on the floor, she called ems, she states that she believes that she was not breathing for at least 5 minutes before ems arrived. When ems arrived they intubated her and "gave her some medication through the arm" (presumably epi?). Ms. troncoso is not sure if they actually did any chest compressions on her as she was crying and distraught and was not entirely able to paying attention. Ms. Troncoso states that the only family she is aware of that the pt has is a cousin and a brother named Julian carlton (does not know his number), she provides the office number for the apartment complex where they live and states that they might have more info: [phone]. Ms. Troncoso is unsure of any other of the pt's pmh or is she uses any substances and states that she generally "stays out of her business." Additional collateral history obtained from Adam Elliott ([phone]) who had been in the ED earlier in the day and provided the ED with her number. Ms. Rogers states that she is a family friend of the pt and describes herself as a "grandmother" to the pt's 6 year old daughter (who is also autistic). Ms. Rogers states that the pt is autistic and believes she has asthma, does not know if she takes any medications and is unsure if she has any other pmh, she does not know the numbers to the pt's brother or cousin. Ms. Rogers provided the phone number for the pt's neighbor above.     In the ED, pt given fluids and solumedrol, started on a propofol and versed drip  Vitals: Vital Signs Last 24 Hrs  T(C): 36.6 (2022 12:47), Max: 36.6 (2022 12:47)  T(F): 97.9 (:47), Max: 97.9 (:47)  HR: 82 (:47) (78 - 123)  BP: 108/60 (:47) (94/68 - 147/71)  BP(mean): 77 (2022 12:00) (76 - 99)  RR: 20 (:47) (14 - 20)  SpO2: 99% (:47) (99% - 100%)    Parameters below as of :47  Patient On (Oxygen Delivery Method): ventilator    Labs: remarkbale for wbc 21    Imaging:   < from: CT Angio Chest PE Protocol w/ IV Cont (22 @ 09:02) >  IMPRESSION:    1.  No evidence of a pulmonary embolism.    2.  Endotracheal tube is in satisfactory position. Minimal secretions in   the distal trachea and right mainstem bronchus.    < end of copied text >  < from: CT Head No Cont (22 @ 09:01) >  IMPRESSION:    Diffuse effacement of the bilateral cerebral/cerebellar sulci and   scattered blurring of gray-white matter differentiation. The finding   could reflect global hypoxic/ischemic injury.    No intracranial hemorrhage or midline shift.    < end of copied text >  < from: Xray Chest 1 View-PORTABLE IMMEDIATE (Xray Chest 1 View-PORTABLE IMMEDIATE .) (22 @ 06:53) >  Impression:    No radiographic evidence of acute cardiopulmonary disease.    ETT.    < end of copied text >     ?54 yo female hx of asthma BIBA 2/2 respiratory arrest. as per EMS, patient went to her neighbor and knocking on the door and asking for help. Patient then collapsed. Neighbor said patient had hx of asthma. EMS intubated patient on scene and brought patient to ED for evaluation. (2022 13:28)      PAST MEDICAL & SURGICAL HISTORY:  Asthma          FAMILY HISTORY:      Social History: (-) x 3    Allergies    Allergy Status Unknown    Intolerances        MEDICATIONS  (STANDING):  albuterol    90 MICROgram(s) HFA Inhaler 2 Puff(s) Inhalation Once  albuterol/ipratropium for Nebulization 3 milliLiter(s) Nebulizer every 6 hours  dexMEDEtomidine Infusion 0.2 MICROgram(s)/kG/Hr (5 mL/Hr) IV Continuous <Continuous>  enoxaparin Injectable 40 milliGRAM(s) SubCutaneous every 24 hours  fentaNYL   Infusion. 0.5 MICROgram(s)/kG/Hr (5 mL/Hr) IV Continuous <Continuous>  levETIRAcetam  IVPB 2000 milliGRAM(s) IV Intermittent once  levETIRAcetam  IVPB 1500 milliGRAM(s) IV Intermittent every 12 hours  methylPREDNISolone sodium succinate Injectable 60 milliGRAM(s) IV Push every 8 hours  midazolam Infusion 0.02 mG/kG/Hr (2 mL/Hr) IV Continuous <Continuous>  propofol Infusion 10 MICROgram(s)/kG/Min (6 mL/Hr) IV Continuous <Continuous>    MEDICATIONS  (PRN):        Vital Signs Last 24 Hrs  T(C): 36.7 (01 Dec 2022 02:00), Max: 36.8 (2022 16:00)  T(F): 98 (01 Dec 2022 02:00), Max: 98.2 (2022 16:00)  HR: 70 (01 Dec 2022 02:00) (70 - 123)  BP: 104/62 (01 Dec 2022 02:00) (94/68 - 147/71)  BP(mean): 83 (2022 16:00) (76 - 99)  RR: 18 (01 Dec 2022 02:00) (14 - 20)  SpO2: 100% (01 Dec 2022 02:00) (99% - 100%)    Parameters below as of 01 Dec 2022 02:00  Patient On (Oxygen Delivery Method): ventilator    O2 Concentration (%): 40    Neurological Examination: Intubated and sedated   Mental Status: Limited by sedation, patient is on propofol, fentanyl and precedex  CN: No gaze preference, +blink to threat, no facial asymmetry  Motor: No spontaneous movement  Sensory:No withdrawal to pain to all 4           Labs:   CBC Full  -  ( 2022 06:20 )  WBC Count : 21.38 K/uL  RBC Count : 4.68 M/uL  Hemoglobin : 13.7 g/dL  Hematocrit : 43.1 %  Platelet Count - Automated : 451 K/uL  Mean Cell Volume : 92.1 fL  Mean Cell Hemoglobin : 29.3 pg  Mean Cell Hemoglobin Concentration : 31.8 g/dL  Auto Neutrophil # : 10.71 K/uL  Auto Lymphocyte # : 7.39 K/uL  Auto Monocyte # : 1.18 K/uL  Auto Eosinophil # : 1.39 K/uL  Auto Basophil # : 0.11 K/uL  Auto Neutrophil % : 50.1 %  Auto Lymphocyte % : 34.6 %  Auto Monocyte % : 5.5 %  Auto Eosinophil % : 6.5 %  Auto Basophil % : 0.5 %    1130    134<L>  |  99  |  10  ----------------------------<  305<H>  4.5   |  20  |  0.9    Ca    8.4      2022 06:20    TPro  6.6  /  Alb  4.1  /  TBili  <0.2  /  DBili  x   /  AST  23  /  ALT  14  /  AlkPhos  92  11-30    LIVER FUNCTIONS - ( 2022 06:20 )  Alb: 4.1 g/dL / Pro: 6.6 g/dL / ALK PHOS: 92 U/L / ALT: 14 U/L / AST: 23 U/L / GGT: x           PT/INR - ( 2022 06:20 )   PT: 10.30 sec;   INR: 0.91 ratio         PTT - ( 2022 06:20 )  PTT:24.4 sec  Urinalysis Basic - ( 2022 10:21 )    Color: Light Yellow / Appearance: Clear / S.044 / pH: x  Gluc: x / Ketone: Negative  / Bili: Negative / Urobili: <2 mg/dL   Blood: x / Protein: Trace / Nitrite: Negative   Leuk Esterase: Negative / RBC: x / WBC x   Sq Epi: x / Non Sq Epi: x / Bacteria: x          Neuroimaging:  NCHCT: CT Head No Cont:   ACC: [id number] EXAM:  CT BRAIN                          PROCEDURE DATE:  2022          INTERPRETATION:  CLINICAL INDICATION: Cardiac arrest    TECHNIQUE: CT of the head was performed without the administration of   intravenous contrast.    COMPARISON: None available    FINDINGS:    There is diffuse effacement of the bilateral cerebral sulci and   cerebellar sulci.    There are scattered regions of gray-white matter blurring, especially in   the bilateral basal ganglia.    There is no evidence of intracranial hemorrhage. There is no midline   shift.    There is no evidence of hydrocephalus. There are no extra-axial fluid   collections.    The visualized intraorbital contents are normal. There is extensive   polypoid mucosal thickening in bilateral ethmoid sinuses. Mild mucosal   thickening in bilateral maxillary sinuses. The mastoid air cells are   aerated. The visualized soft tissues and osseous structures appear normal.      IMPRESSION:    Diffuse effacement of the bilateral cerebral/cerebellar sulci and   scattered blurring of gray-white matter differentiation. The finding   could reflect global hypoxic/ischemic injury.    No intracranial hemorrhage or midline shift.    Communication: The summary of above findings were discussed with readback   confirmation with Dr. Becerra by radiologist Dr. Salazar on 2022 at   9:25 AM.    --- End of Report ---            PATRICK SALAZAR MD; Attending Radiologist  This document has been electronically signed. 2022  9:26AM (22 @ 09:01)      22 @ 02:52

## 2022-12-01 NOTE — PROGRESS NOTE ADULT - ASSESSMENT
PLAN:    CNS/Psych:   >>Appreciate neuro reccs-- D/C Keppra, routine EEG on minimal sedation, possibly repeat CTH when extubated  >>off sedation now extubated      HEENT: Oral care, ETT care  ETT day #1    PULMONARY:  HOB @ 45 degrees.    >>12/1 extubated successfully today after passing SBT with normal abg  >>Satting well, comfortable on room air  >>c/w solumedrol 60 Q8 hr, albuterol nebs     CARDIOVASCULAR:   >>MAP adequate, avoid volume overload  >>12/1 TTE: LVEF 55-60%, mild TR, otherwise normal    GI:   >>switched to soft diet post extubation  >>speech and swallow eval pending    RENAL:  Follow up lytes.  Correct as needed    INFECTIOUS DISEASE:   >>Follow up cultures, procal, RVP    HEMATOLOGICAL:    >>c/w lovenox dvt prophylaxis    ENDOCRINE:  Follow up FS.  Insulin protocol if needed.    MUSCULOSKELETAL: PT/OT when cooperative    DISPO/SOCIAL  Identify patient in system, Angelica Carlton, ensure patient's child is in safe custody, discuss w/ social work    MICU

## 2022-12-01 NOTE — CONSULT NOTE ADULT - ASSESSMENT
Assessment:Pt is (reportedly) a 30 y/o F with autism and asthma? who is BIBA after respiratory (cardiac?) arrest. Patient was intubated on the scene by EMS, possible chest compressions. Neurocritical care Dr. Schwab evaluated the patient and does not think patient has anoxic brain injury. When patient was off sedation, she was awake and agitated.  Neurology was called to evaluate the patient. Patient was intubated and sedated on examiner's exam. CTH Diffuse effacement of the bilateral cerebral/cerebellar sulci and scattered blurring of gray-white matter differentiation. The finding could reflect global hypoxic/ischemic injury.    Suggestions:  -Load with Keppra 2 grams x once  -Maintenance dose Keppra 1500 mg BID  -Keep Mg>2  -Order vEEG (12-26 hours, unmonitored)  -Seizure precautions  -MRI brain non contrast   -Avoid sedative properties when able  -Medical management as per critical care team     Discussed case with Dr. Lilly on call. Pending neurology attending note to follow Assessment:Pt is (reportedly) a 30 y/o F with autism and asthma? who is BIBA after respiratory (cardiac?) arrest. Patient was intubated on the scene by EMS, possible chest compressions. Neurocritical care Dr. Schwab evaluated the patient and does not think patient has anoxic brain injury. When patient was off sedation, she was awake and agitated.  Neurology was called to evaluate the patient. Patient was intubated and sedated on examiner's exam. CTH Diffuse effacement of the bilateral cerebral/cerebellar sulci and scattered blurring of gray-white matter differentiation. The finding could reflect global hypoxic/ischemic injury.    Suggestions:  -Load with Keppra 2 grams x once  -Maintenance dose Keppra 1500 mg BID  -Keep Mg>2  -Routine EEG   -Seizure precautions  -Avoid sedative properties when able  -Medical management as per critical care team     Discussed case with Dr. Lilly on call. Pending neurology attending note to follow

## 2022-12-02 LAB
ALBUMIN SERPL ELPH-MCNC: 4.3 G/DL — SIGNIFICANT CHANGE UP (ref 3.5–5.2)
ALP SERPL-CCNC: 84 U/L — SIGNIFICANT CHANGE UP (ref 30–115)
ALT FLD-CCNC: 13 U/L — SIGNIFICANT CHANGE UP (ref 0–41)
ANION GAP SERPL CALC-SCNC: 11 MMOL/L — SIGNIFICANT CHANGE UP (ref 7–14)
AST SERPL-CCNC: 16 U/L — SIGNIFICANT CHANGE UP (ref 0–41)
BASOPHILS # BLD AUTO: 0.03 K/UL — SIGNIFICANT CHANGE UP (ref 0–0.2)
BASOPHILS NFR BLD AUTO: 0.1 % — SIGNIFICANT CHANGE UP (ref 0–1)
BILIRUB SERPL-MCNC: 0.4 MG/DL — SIGNIFICANT CHANGE UP (ref 0.2–1.2)
BUN SERPL-MCNC: 15 MG/DL — SIGNIFICANT CHANGE UP (ref 10–20)
CALCIUM SERPL-MCNC: 9 MG/DL — SIGNIFICANT CHANGE UP (ref 8.4–10.4)
CHLORIDE SERPL-SCNC: 103 MMOL/L — SIGNIFICANT CHANGE UP (ref 98–110)
CO2 SERPL-SCNC: 25 MMOL/L — SIGNIFICANT CHANGE UP (ref 17–32)
CREAT SERPL-MCNC: 0.6 MG/DL — LOW (ref 0.7–1.5)
EGFR: 109 ML/MIN/1.73M2 — SIGNIFICANT CHANGE UP
EOSINOPHIL # BLD AUTO: 0 K/UL — SIGNIFICANT CHANGE UP (ref 0–0.7)
EOSINOPHIL NFR BLD AUTO: 0 % — SIGNIFICANT CHANGE UP (ref 0–8)
GLUCOSE SERPL-MCNC: 121 MG/DL — HIGH (ref 70–99)
HCT VFR BLD CALC: 42.3 % — SIGNIFICANT CHANGE UP (ref 37–47)
HGB BLD-MCNC: 13.7 G/DL — SIGNIFICANT CHANGE UP (ref 12–16)
IMM GRANULOCYTES NFR BLD AUTO: 0.7 % — HIGH (ref 0.1–0.3)
LYMPHOCYTES # BLD AUTO: 1.18 K/UL — LOW (ref 1.2–3.4)
LYMPHOCYTES # BLD AUTO: 5.2 % — LOW (ref 20.5–51.1)
MAGNESIUM SERPL-MCNC: 2.3 MG/DL — SIGNIFICANT CHANGE UP (ref 1.8–2.4)
MCHC RBC-ENTMCNC: 28.9 PG — SIGNIFICANT CHANGE UP (ref 27–31)
MCHC RBC-ENTMCNC: 32.4 G/DL — SIGNIFICANT CHANGE UP (ref 32–37)
MCV RBC AUTO: 89.2 FL — SIGNIFICANT CHANGE UP (ref 81–99)
MONOCYTES # BLD AUTO: 0.54 K/UL — SIGNIFICANT CHANGE UP (ref 0.1–0.6)
MONOCYTES NFR BLD AUTO: 2.4 % — SIGNIFICANT CHANGE UP (ref 1.7–9.3)
NEUTROPHILS # BLD AUTO: 20.76 K/UL — HIGH (ref 1.4–6.5)
NEUTROPHILS NFR BLD AUTO: 91.6 % — HIGH (ref 42.2–75.2)
NRBC # BLD: 0 /100 WBCS — SIGNIFICANT CHANGE UP (ref 0–0)
PLATELET # BLD AUTO: 372 K/UL — SIGNIFICANT CHANGE UP (ref 130–400)
POTASSIUM SERPL-MCNC: 4.5 MMOL/L — SIGNIFICANT CHANGE UP (ref 3.5–5)
POTASSIUM SERPL-SCNC: 4.5 MMOL/L — SIGNIFICANT CHANGE UP (ref 3.5–5)
PROCALCITONIN SERPL-MCNC: 0.8 NG/ML — HIGH (ref 0.02–0.1)
PROT SERPL-MCNC: 6.8 G/DL — SIGNIFICANT CHANGE UP (ref 6–8)
RBC # BLD: 4.74 M/UL — SIGNIFICANT CHANGE UP (ref 4.2–5.4)
RBC # FLD: 13.3 % — SIGNIFICANT CHANGE UP (ref 11.5–14.5)
SODIUM SERPL-SCNC: 139 MMOL/L — SIGNIFICANT CHANGE UP (ref 135–146)
TSH SERPL-MCNC: 1.05 UIU/ML — SIGNIFICANT CHANGE UP (ref 0.27–4.2)
WBC # BLD: 22.67 K/UL — HIGH (ref 4.8–10.8)
WBC # FLD AUTO: 22.67 K/UL — HIGH (ref 4.8–10.8)

## 2022-12-02 PROCEDURE — 71045 X-RAY EXAM CHEST 1 VIEW: CPT | Mod: 26

## 2022-12-02 PROCEDURE — 95819 EEG AWAKE AND ASLEEP: CPT | Mod: 26

## 2022-12-02 PROCEDURE — 70553 MRI BRAIN STEM W/O & W/DYE: CPT | Mod: 26

## 2022-12-02 PROCEDURE — 99291 CRITICAL CARE FIRST HOUR: CPT

## 2022-12-02 PROCEDURE — 93010 ELECTROCARDIOGRAM REPORT: CPT

## 2022-12-02 PROCEDURE — 99232 SBSQ HOSP IP/OBS MODERATE 35: CPT | Mod: GC,25

## 2022-12-02 RX ORDER — ONDANSETRON 8 MG/1
4 TABLET, FILM COATED ORAL
Refills: 0 | Status: DISCONTINUED | OUTPATIENT
Start: 2022-12-02 | End: 2022-12-02

## 2022-12-02 RX ADMIN — CHLORHEXIDINE GLUCONATE 15 MILLILITER(S): 213 SOLUTION TOPICAL at 18:23

## 2022-12-02 RX ADMIN — CHLORHEXIDINE GLUCONATE 15 MILLILITER(S): 213 SOLUTION TOPICAL at 05:45

## 2022-12-02 RX ADMIN — Medication 60 MILLIGRAM(S): at 18:23

## 2022-12-02 RX ADMIN — ENOXAPARIN SODIUM 40 MILLIGRAM(S): 100 INJECTION SUBCUTANEOUS at 05:46

## 2022-12-02 RX ADMIN — CHLORHEXIDINE GLUCONATE 1 APPLICATION(S): 213 SOLUTION TOPICAL at 05:46

## 2022-12-02 RX ADMIN — Medication 60 MILLIGRAM(S): at 05:45

## 2022-12-02 NOTE — PROGRESS NOTE ADULT - SUBJECTIVE AND OBJECTIVE BOX
Neurology Progress Note    Interval History:    No acute events overnight. Pt extubated and off sedation this morning, sitting in chair comfortably.    Medications:  albuterol    90 MICROgram(s) HFA Inhaler 2 Puff(s) Inhalation Once  chlorhexidine 0.12% Liquid 15 milliLiter(s) Oral Mucosa every 12 hours  chlorhexidine 2% Cloths 1 Application(s) Topical <User Schedule>  dexMEDEtomidine Infusion 0.2 MICROgram(s)/kG/Hr IV Continuous <Continuous>  enoxaparin Injectable 40 milliGRAM(s) SubCutaneous every 24 hours  methylPREDNISolone sodium succinate Injectable 60 milliGRAM(s) IV Push every 12 hours  propofol Infusion 10 MICROgram(s)/kG/Min IV Continuous <Continuous>      Vital Signs Last 24 Hrs  T(C): 36.9 (02 Dec 2022 08:00), Max: 36.9 (02 Dec 2022 08:00)  T(F): 98.4 (02 Dec 2022 08:00), Max: 98.4 (02 Dec 2022 08:00)  HR: 121 (02 Dec 2022 08:00) (69 - 121)  BP: 120/61 (02 Dec 2022 08:00) (91/53 - 120/61)  BP(mean): 85 (02 Dec 2022 08:00) (67 - 85)  RR: 27 (02 Dec 2022 08:00) (11 - 38)  SpO2: 95% (02 Dec 2022 08:00) (94% - 100%)    Parameters below as of 02 Dec 2022 06:00  Patient On (Oxygen Delivery Method): room air        Neurological Examination:  General:  Appearance is consistent with chronologic age.  No abnormal facies.  Gross skin survey within normal limits.    Cognitive/Language:  Awake, alert, and oriented to person, place, time and date.  Recent and remote memory intact.  Fund of knowledge is appropriate.  Naming, repetition and comprehension intact.   Nondysarthric.    Cranial Nerves  - Eyes: Visual acuity intact, Visual fields full.  EOMI w/o nystagmus, skew or reported double vision.  PERRL.  No ptosis/weakness of eyelid closure.    - Face:  Facial sensation normal V1 - 3, no facial asymmetry.    - Ears/Nose/Throat:  Hearing grossly intact b/l to finger rub.  Palate elevates midline.  Tongue and uvula midline.   Motor examination:  Upper Extremities: L 5/5, R 5/5; Lower extremities: L 5/5, R 5/5.  No observable drift. Normal tone and bulk. No tenderness, twitching, tremors or involuntary movements.  Sensory examination:   Intact to light touch and pinprick, pain, temperature and proprioception and vibration in all extremities.  Reflexes:   2+ b/l biceps, triceps, brachioradialis, patella and achilles.  Plantar response downgoing b/l.  Jaw jerk, Benito, clonus absent.  Cerebellum:   FTN/HKS intact.  No dysmetria or dysdiadokinesia.  Gait narrow based and normal.    Labs:  CBC Full  -  ( 02 Dec 2022 04:53 )  WBC Count : 22.67 K/uL  RBC Count : 4.74 M/uL  Hemoglobin : 13.7 g/dL  Hematocrit : 42.3 %  Platelet Count - Automated : 372 K/uL  Mean Cell Volume : 89.2 fL  Mean Cell Hemoglobin : 28.9 pg  Mean Cell Hemoglobin Concentration : 32.4 g/dL  Auto Neutrophil # : 20.76 K/uL  Auto Lymphocyte # : 1.18 K/uL  Auto Monocyte # : 0.54 K/uL  Auto Eosinophil # : 0.00 K/uL  Auto Basophil # : 0.03 K/uL  Auto Neutrophil % : 91.6 %  Auto Lymphocyte % : 5.2 %  Auto Monocyte % : 2.4 %  Auto Eosinophil % : 0.0 %  Auto Basophil % : 0.1 %        139  |  103  |  15  ----------------------------<  121<H>  4.5   |  25  |  0.6<L>    Ca    9.0      02 Dec 2022 04:53  Mg     2.3     12-    TPro  6.8  /  Alb  4.3  /  TBili  0.4  /  DBili  x   /  AST  16  /  ALT  13  /  AlkPhos  84  12-    LIVER FUNCTIONS - ( 02 Dec 2022 04:53 )  Alb: 4.3 g/dL / Pro: 6.8 g/dL / ALK PHOS: 84 U/L / ALT: 13 U/L / AST: 16 U/L / GGT: x             Urinalysis Basic - ( 2022 10:21 )    Color: Light Yellow / Appearance: Clear / S.044 / pH: x  Gluc: x / Ketone: Negative  / Bili: Negative / Urobili: <2 mg/dL   Blood: x / Protein: Trace / Nitrite: Negative   Leuk Esterase: Negative / RBC: x / WBC x   Sq Epi: x / Non Sq Epi: x / Bacteria: x       Neurology Progress Note    Interval History:    No acute events overnight. Pt extubated and off sedation this morning, sitting in chair comfortably.    Medications:  albuterol    90 MICROgram(s) HFA Inhaler 2 Puff(s) Inhalation Once  chlorhexidine 0.12% Liquid 15 milliLiter(s) Oral Mucosa every 12 hours  chlorhexidine 2% Cloths 1 Application(s) Topical <User Schedule>  dexMEDEtomidine Infusion 0.2 MICROgram(s)/kG/Hr IV Continuous <Continuous>  enoxaparin Injectable 40 milliGRAM(s) SubCutaneous every 24 hours  methylPREDNISolone sodium succinate Injectable 60 milliGRAM(s) IV Push every 12 hours  propofol Infusion 10 MICROgram(s)/kG/Min IV Continuous <Continuous>      Vital Signs Last 24 Hrs  T(C): 36.9 (02 Dec 2022 08:00), Max: 36.9 (02 Dec 2022 08:00)  T(F): 98.4 (02 Dec 2022 08:00), Max: 98.4 (02 Dec 2022 08:00)  HR: 121 (02 Dec 2022 08:00) (69 - 121)  BP: 120/61 (02 Dec 2022 08:00) (91/53 - 120/61)  BP(mean): 85 (02 Dec 2022 08:00) (67 - 85)  RR: 27 (02 Dec 2022 08:00) (11 - 38)  SpO2: 95% (02 Dec 2022 08:00) (94% - 100%)    Parameters below as of 02 Dec 2022 06:00  Patient On (Oxygen Delivery Method): room air        Neurological Examination:  General:  Appearance is consistent with chronologic age.  No abnormal facies.  Gross skin survey within normal limits.    Cognitive/Language:  Awake, alert, and oriented to person, place, time and date.  Recent and remote memory intact.  Fund of knowledge is appropriate.  Naming, repetition and comprehension intact.   Nondysarthric.    Cranial Nerves  - Eyes: Visual acuity intact, Visual fields full.  EOMI w/o nystagmus, skew or reported double vision.  PERRL.  No ptosis/weakness of eyelid closure.    - Face:  Facial sensation normal V1 - 3, no facial asymmetry.    - Ears/Nose/Throat:  Hearing grossly intact  Motor examination:  Upper Extremities: L 5/5, R 5/5; Lower extremities: L 5/5, R 5/5.  No observable drift. Normal tone and bulk. No tenderness, twitching, tremors or involuntary movements.  Sensory examination:   Intact to light touch throughout  Reflexes:   2+ b/l biceps, triceps, brachioradialis, patella and achilles.   Cerebellum:   FTN intact    Labs:  CBC Full  -  ( 02 Dec 2022 04:53 )  WBC Count : 22.67 K/uL  RBC Count : 4.74 M/uL  Hemoglobin : 13.7 g/dL  Hematocrit : 42.3 %  Platelet Count - Automated : 372 K/uL  Mean Cell Volume : 89.2 fL  Mean Cell Hemoglobin : 28.9 pg  Mean Cell Hemoglobin Concentration : 32.4 g/dL  Auto Neutrophil # : 20.76 K/uL  Auto Lymphocyte # : 1.18 K/uL  Auto Monocyte # : 0.54 K/uL  Auto Eosinophil # : 0.00 K/uL  Auto Basophil # : 0.03 K/uL  Auto Neutrophil % : 91.6 %  Auto Lymphocyte % : 5.2 %  Auto Monocyte % : 2.4 %  Auto Eosinophil % : 0.0 %  Auto Basophil % : 0.1 %        139  |  103  |  15  ----------------------------<  121<H>  4.5   |  25  |  0.6<L>    Ca    9.0      02 Dec 2022 04:53  Mg     2.3     12-    TPro  6.8  /  Alb  4.3  /  TBili  0.4  /  DBili  x   /  AST  16  /  ALT  13  /  AlkPhos  84  12-    LIVER FUNCTIONS - ( 02 Dec 2022 04:53 )  Alb: 4.3 g/dL / Pro: 6.8 g/dL / ALK PHOS: 84 U/L / ALT: 13 U/L / AST: 16 U/L / GGT: x             Urinalysis Basic - ( 2022 10:21 )    Color: Light Yellow / Appearance: Clear / S.044 / pH: x  Gluc: x / Ketone: Negative  / Bili: Negative / Urobili: <2 mg/dL   Blood: x / Protein: Trace / Nitrite: Negative   Leuk Esterase: Negative / RBC: x / WBC x   Sq Epi: x / Non Sq Epi: x / Bacteria: x

## 2022-12-02 NOTE — PROGRESS NOTE ADULT - ATTENDING COMMENTS
I have personally seen and examined this patient on 12/2. I have fully participated in the care of this patient.  I have reviewed all pertinent clinical information, including history, physical exam, plan and note. Patient is now extubated and follows commands. No focal deficit. REEG showed frontal epileptiform discharges and bilateral focal slowing suggestive of encephalitis. Recommend to r/o structural causes first. Highly recommend Brain MRI w/wo first. Will follow   I have reviewed all pertinent clinical information and reviewed all relevant imaging and diagnostic studies personally.  Recommendations as above.  Agree with above assessment except as noted.
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
## 2022-12-02 NOTE — PROGRESS NOTE ADULT - SUBJECTIVE AND OBJECTIVE BOX
Patient is a 53y old  Female who presents with a chief complaint of   HPI:  Pt is (reportedly) a 30 y/o F with autism and asthma? who is BIBA after respiratory (cardiac?) arrest. Reportedly the pt's name is Marysol Carlton and lives at [address] [place] [address]. Pt was intubated in the field by EMS. Collateral history obtained from multiple sources. The ED reports that pt received one push of epi in the field, they are unsure if the patient was actually in cardiac arrest or not when ems arrived at the scene, unsure if pt had undergone any chest compressions. More hx obtained from pt's neighbor Ms. Troncoso ([phone], this number was obtained from sue elliott-see below). Ms. Troncoso states that early this morning the pt was frantically knocking on her door, when Ms troncoso opened the door, the pt she was not wearing any clothes and was in severe respiratory distress saying that she couldn't breathe. Ms. Troncoso states that the pt started turning blue and collapsed on the floor, she called ems, she states that she believes that she was not breathing for at least 5 minutes before ems arrived. When ems arrived they intubated her and "gave her some medication through the arm" (presumably epi?). Ms. troncoso is not sure if they actually did any chest compressions on her as she was crying and distraught and was not entirely able to paying attention. Ms. Troncoso states that the only family she is aware of that the pt has is a cousin and a brother named Julian carlton (does not know his number), she provides the office number for the apartment complex where they live and states that they might have more info: [phone]. Ms. Troncoso is unsure of any other of the pt's pmh or is she uses any substances and states that she generally "stays out of her business." Additional collateral history obtained from Sue Elliott ([phone]) who had been in the ED earlier in the day and provided the ED with her number. Ms. Rogers states that she is a family friend of the pt and describes herself as a "grandmother" to the pt's 6 year old daughter (who is also autistic). Ms. Rogers states that the pt is autistic and believes she has asthma, does not know if she takes any medications and is unsure if she has any other pmh, she does not know the numbers to the pt's brother or cousin. Ms. Rogers provided the phone number for the pt's neighbor above.     In the ED, pt given fluids and solumedrol, started on a propofol and versed drip  Vitals: Vital Signs Last 24 Hrs  T(C): 36.6 (2022 12:47), Max: 36.6 (:47)  T(F): 97.9 (:47), Max: 97.9 (:47)  HR: 82 (:) (78 - 123)  BP: 108/60 (:47) (94/68 - 147/71)  BP(mean): 77 (2022 12:00) (76 - 99)  RR: 20 (:47) (14 - 20)  SpO2: 99% (:47) (99% - 100%)    Parameters below as of :47  Patient On (Oxygen Delivery Method): ventilator    Labs: remarkbale for wbc 21    Imaging:   < from: CT Angio Chest PE Protocol w/ IV Cont (22 @ 09:02) >  IMPRESSION:    1.  No evidence of a pulmonary embolism.    2.  Endotracheal tube is in satisfactory position. Minimal secretions in   the distal trachea and right mainstem bronchus.    < end of copied text >  < from: CT Head No Cont (22 @ 09:01) >  IMPRESSION:    Diffuse effacement of the bilateral cerebral/cerebellar sulci and   scattered blurring of gray-white matter differentiation. The finding   could reflect global hypoxic/ischemic injury.    No intracranial hemorrhage or midline shift.    < end of copied text >  < from: Xray Chest 1 View-PORTABLE IMMEDIATE (Xray Chest 1 View-PORTABLE IMMEDIATE .) (22 @ 06:53) >  Impression:    No radiographic evidence of acute cardiopulmonary disease.    ETT.    < end of copied text >            ?54 yo female hx of asthma BIBA 2/2 respiratory arrest. as per EMS, patient went to her neighbor and knocking on the door and asking for help. Patient then collapsed. Neighbor said patient had hx of asthma. EMS intubated patient on scene and brought patient to ED for evaluation. (2022 13:28)       INTERVAL HPI/OVERNIGHT EVENTS:   No overnight events   Afebrile, hemodynamically stable     Subjective:    ICU Vital Signs Last 24 Hrs  T(C): 36.5 (02 Dec 2022 00:00), Max: 36.6 (01 Dec 2022 20:00)  T(F): 97.7 (02 Dec 2022 00:00), Max: 97.9 (01 Dec 2022 20:00)  HR: 117 (02 Dec 2022 05:00) (64 - 117)  BP: 118/63 (02 Dec 2022 05:00) (91/53 - 118/63)  BP(mean): 84 (02 Dec 2022 05:00) (67 - 86)  ABP: --  ABP(mean): --  RR: 38 (02 Dec 2022 05:00) (11 - 38)  SpO2: 96% (02 Dec 2022 05:00) (94% - 100%)    O2 Parameters below as of 02 Dec 2022 05:00  Patient On (Oxygen Delivery Method): nasal cannula  O2 Flow (L/min): 3        I&O's Summary    2022 07:01  -  01 Dec 2022 07:00  --------------------------------------------------------  IN: 399.2 mL / OUT: 1575 mL / NET: -1175.8 mL    01 Dec 2022 07:01  -  02 Dec 2022 06:35  --------------------------------------------------------  IN: 845.5 mL / OUT: 1740 mL / NET: -894.5 mL      Mode: CPAP with PS  FiO2: 40  PEEP: 8  PS: 5  MAP: 11  PIP: 17      Daily     Daily Weight in k.4 (02 Dec 2022 05:00)    Adult Advanced Hemodynamics Last 24 Hrs  CVP(mm Hg): --  CVP(cm H2O): --  CO: --  CI: --  PA: --  PA(mean): --  PCWP: --  SVR: --  SVRI: --  PVR: --  PVRI: --    EKG/Telemetry Events:    MEDICATIONS  (STANDING):  albuterol    90 MICROgram(s) HFA Inhaler 2 Puff(s) Inhalation Once  chlorhexidine 0.12% Liquid 15 milliLiter(s) Oral Mucosa every 12 hours  chlorhexidine 2% Cloths 1 Application(s) Topical <User Schedule>  dexMEDEtomidine Infusion 0.2 MICROgram(s)/kG/Hr (5 mL/Hr) IV Continuous <Continuous>  enoxaparin Injectable 40 milliGRAM(s) SubCutaneous every 24 hours  methylPREDNISolone sodium succinate Injectable 60 milliGRAM(s) IV Push every 8 hours  propofol Infusion 10 MICROgram(s)/kG/Min (6 mL/Hr) IV Continuous <Continuous>    MEDICATIONS  (PRN):      PHYSICAL EXAM:  GENERAL:   HEAD:  Atraumatic, Normocephalic  EYES: EOMI, PERRLA, conjunctiva and sclera clear  NECK: Supple, No JVD, Normal thyroid, no enlarged nodes  NERVOUS SYSTEM:  Alert & Awake.   CHEST/LUNG: B/L good air entry; No rales, rhonchi, or wheezing  HEART: S1S2 normal, no S3, Regular rate and rhythm; No murmurs  ABDOMEN: Soft, Nontender, Nondistended; Bowel sounds present  EXTREMITIES:  2+ Peripheral Pulses, No clubbing, cyanosis, or edema  LYMPH: No lymphadenopathy noted  SKIN: No rashes or lesions    LABS:                        13.7   22.67 )-----------( 372      ( 02 Dec 2022 04:53 )             42.3     12-    139  |  103  |  15  ----------------------------<  121<H>  4.5   |  25  |  0.6<L>    Ca    9.0      02 Dec 2022 04:53  Mg     2.3     12-    TPro  6.8  /  Alb  4.3  /  TBili  0.4  /  DBili  x   /  AST  16  /  ALT  13  /  AlkPhos  84  12    LIVER FUNCTIONS - ( 02 Dec 2022 04:53 )  Alb: 4.3 g/dL / Pro: 6.8 g/dL / ALK PHOS: 84 U/L / ALT: 13 U/L / AST: 16 U/L / GGT: x             CAPILLARY BLOOD GLUCOSE        ABG - ( 01 Dec 2022 10:36 )  pH, Arterial: 7.37  pH, Blood: x     /  pCO2: 44    /  pO2: 98    / HCO3: 25    / Base Excess: -0.2  /  SaO2: x                 Troponin T, Serum: <0.01 ng/mL ( @ 09:56)    CARDIAC MARKERS ( 01 Dec 2022 09:56 )  x     / <0.01 ng/mL / x     / x     / x          Urinalysis Basic - ( 2022 10:21 )    Color: Light Yellow / Appearance: Clear / S.044 / pH: x  Gluc: x / Ketone: Negative  / Bili: Negative / Urobili: <2 mg/dL   Blood: x / Protein: Trace / Nitrite: Negative   Leuk Esterase: Negative / RBC: x / WBC x   Sq Epi: x / Non Sq Epi: x / Bacteria: x          RADIOLOGY & ADDITIONAL TESTS:  CXR:        Care Discussed with Consultants/Other Providers [ x] YES  [ ] NO           Patient is a 53y old  Female who presents with a chief complaint of   HPI:  Pt is (reportedly) a 30 y/o F with autism and asthma? who is BIBA after respiratory (cardiac?) arrest. Reportedly the pt's name is Marysol Carlton and lives at [address] [place] [address]. Pt was intubated in the field by EMS. Collateral history obtained from multiple sources. The ED reports that pt received one push of epi in the field, they are unsure if the patient was actually in cardiac arrest or not when ems arrived at the scene, unsure if pt had undergone any chest compressions. More hx obtained from pt's neighbor Ms. Troncoso ([phone], this number was obtained from sue elliott-see below). Ms. Troncoso states that early this morning the pt was frantically knocking on her door, when Ms troncoso opened the door, the pt she was not wearing any clothes and was in severe respiratory distress saying that she couldn't breathe. Ms. Troncoso states that the pt started turning blue and collapsed on the floor, she called ems, she states that she believes that she was not breathing for at least 5 minutes before ems arrived. When ems arrived they intubated her and "gave her some medication through the arm" (presumably epi?). Ms. troncoso is not sure if they actually did any chest compressions on her as she was crying and distraught and was not entirely able to paying attention. Ms. Troncoso states that the only family she is aware of that the pt has is a cousin and a brother named Julian carlton (does not know his number), she provides the office number for the apartment complex where they live and states that they might have more info: [phone]. Ms. Troncoso is unsure of any other of the pt's pmh or is she uses any substances and states that she generally "stays out of her business." Additional collateral history obtained from Sue Elliott ([phone]) who had been in the ED earlier in the day and provided the ED with her number. Ms. Rogers states that she is a family friend of the pt and describes herself as a "grandmother" to the pt's 6 year old daughter (who is also autistic). Ms. Rogers states that the pt is autistic and believes she has asthma, does not know if she takes any medications and is unsure if she has any other pmh, she does not know the numbers to the pt's brother or cousin. Ms. Rogers provided the phone number for the pt's neighbor above.     In the ED, pt given fluids and solumedrol, started on a propofol and versed drip  Vitals: Vital Signs Last 24 Hrs  T(C): 36.6 (2022 12:47), Max: 36.6 (:47)  T(F): 97.9 (:47), Max: 97.9 (:47)  HR: 82 (:) (78 - 123)  BP: 108/60 (:47) (94/68 - 147/71)  BP(mean): 77 (2022 12:00) (76 - 99)  RR: 20 (:47) (14 - 20)  SpO2: 99% (:47) (99% - 100%)    Parameters below as of :47  Patient On (Oxygen Delivery Method): ventilator    Labs: remarkbale for wbc 21    Imaging:   < from: CT Angio Chest PE Protocol w/ IV Cont (22 @ 09:02) >  IMPRESSION:    1.  No evidence of a pulmonary embolism.    2.  Endotracheal tube is in satisfactory position. Minimal secretions in   the distal trachea and right mainstem bronchus.    < end of copied text >  < from: CT Head No Cont (22 @ 09:01) >  IMPRESSION:    Diffuse effacement of the bilateral cerebral/cerebellar sulci and   scattered blurring of gray-white matter differentiation. The finding   could reflect global hypoxic/ischemic injury.    No intracranial hemorrhage or midline shift.    < end of copied text >  < from: Xray Chest 1 View-PORTABLE IMMEDIATE (Xray Chest 1 View-PORTABLE IMMEDIATE .) (22 @ 06:53) >  Impression:    No radiographic evidence of acute cardiopulmonary disease.    ETT.    < end of copied text >            ?54 yo female hx of asthma BIBA 2/2 respiratory arrest. as per EMS, patient went to her neighbor and knocking on the door and asking for help. Patient then collapsed. Neighbor said patient had hx of asthma. EMS intubated patient on scene and brought patient to ED for evaluation. (2022 13:28)       INTERVAL HPI/OVERNIGHT EVENTS:   No overnight events   Afebrile, hemodynamically stable     Subjective:    ICU Vital Signs Last 24 Hrs  T(C): 36.5 (02 Dec 2022 00:00), Max: 36.6 (01 Dec 2022 20:00)  T(F): 97.7 (02 Dec 2022 00:00), Max: 97.9 (01 Dec 2022 20:00)  HR: 117 (02 Dec 2022 05:00) (64 - 117)  BP: 118/63 (02 Dec 2022 05:00) (91/53 - 118/63)  BP(mean): 84 (02 Dec 2022 05:00) (67 - 86)  ABP: --  ABP(mean): --  RR: 38 (02 Dec 2022 05:00) (11 - 38)  SpO2: 96% (02 Dec 2022 05:00) (94% - 100%)    O2 Parameters below as of 02 Dec 2022 05:00  Patient On (Oxygen Delivery Method): nasal cannula  O2 Flow (L/min): 3        I&O's Summary    2022 07:01  -  01 Dec 2022 07:00  --------------------------------------------------------  IN: 399.2 mL / OUT: 1575 mL / NET: -1175.8 mL    01 Dec 2022 07:01  -  02 Dec 2022 06:35  --------------------------------------------------------  IN: 845.5 mL / OUT: 1740 mL / NET: -894.5 mL      Mode: CPAP with PS  FiO2: 40  PEEP: 8  PS: 5  MAP: 11  PIP: 17      Daily     Daily Weight in k.4 (02 Dec 2022 05:00)    Adult Advanced Hemodynamics Last 24 Hrs  CVP(mm Hg): --  CVP(cm H2O): --  CO: --  CI: --  PA: --  PA(mean): --  PCWP: --  SVR: --  SVRI: --  PVR: --  PVRI: --    EKG/Telemetry Events:    MEDICATIONS  (STANDING):  albuterol    90 MICROgram(s) HFA Inhaler 2 Puff(s) Inhalation Once  chlorhexidine 0.12% Liquid 15 milliLiter(s) Oral Mucosa every 12 hours  chlorhexidine 2% Cloths 1 Application(s) Topical <User Schedule>  dexMEDEtomidine Infusion 0.2 MICROgram(s)/kG/Hr (5 mL/Hr) IV Continuous <Continuous>  enoxaparin Injectable 40 milliGRAM(s) SubCutaneous every 24 hours  methylPREDNISolone sodium succinate Injectable 60 milliGRAM(s) IV Push every 8 hours  propofol Infusion 10 MICROgram(s)/kG/Min (6 mL/Hr) IV Continuous <Continuous>    MEDICATIONS  (PRN):      PHYSICAL EXAM:  GENERAL:   HEAD:  Atraumatic, Normocephalic  EYES: EOMI, PERRLA, conjunctiva and sclera clear  NECK: Supple, No JVD, Normal thyroid, no enlarged nodes  NERVOUS SYSTEM:  Alert & Awake.   CHEST/LUNG: B/L good air entry; No rales, rhonchi, or wheezing  HEART: S1S2 normal, no S3, Regular rate and rhythm; No murmurs  ABDOMEN: Soft, Nontender, Nondistended; Bowel sounds present  EXTREMITIES:  2+ Peripheral Pulses, No clubbing, cyanosis, or edema  LYMPH: No lymphadenopathy noted  SKIN: No rashes or lesions    LABS:                        13.7   22.67 )-----------( 372      ( 02 Dec 2022 04:53 )             42.3     12-    139  |  103  |  15  ----------------------------<  121<H>  4.5   |  25  |  0.6<L>    Ca    9.0      02 Dec 2022 04:53  Mg     2.3     12-    TPro  6.8  /  Alb  4.3  /  TBili  0.4  /  DBili  x   /  AST  16  /  ALT  13  /  AlkPhos  84  12    LIVER FUNCTIONS - ( 02 Dec 2022 04:53 )  Alb: 4.3 g/dL / Pro: 6.8 g/dL / ALK PHOS: 84 U/L / ALT: 13 U/L / AST: 16 U/L / GGT: x             CAPILLARY BLOOD GLUCOSE        ABG - ( 01 Dec 2022 10:36 )  pH, Arterial: 7.37  pH, Blood: x     /  pCO2: 44    /  pO2: 98    / HCO3: 25    / Base Excess: -0.2  /  SaO2: x                 Troponin T, Serum: <0.01 ng/mL ( @ 09:56)    CARDIAC MARKERS ( 01 Dec 2022 09:56 )  x     / <0.01 ng/mL / x     / x     / x          Urinalysis Basic - ( 2022 10:21 )    Color: Light Yellow / Appearance: Clear / S.044 / pH: x  Gluc: x / Ketone: Negative  / Bili: Negative / Urobili: <2 mg/dL   Blood: x / Protein: Trace / Nitrite: Negative   Leuk Esterase: Negative / RBC: x / WBC x   Sq Epi: x / Non Sq Epi: x / Bacteria: x        RADIOLOGY & ADDITIONAL TESTS:  EEG   Abnormal due to the presence of: generalized slowing as above,  Focal slowing as above  interictal activity as above     MRI Shows:  Limited examination due to motion artifact. Please note there is no   contrast on board on postcontrast sequences possibly due to faulty IV   line.No evidence of acute infarct, intracranial hemorrhage, or midline shift.  Reexpansion of the bilateral cerebral sulci since the prior CT head from   2022 likely representing decreased cerebral edema.      Care Discussed with Consultants/Other Providers [ x] YES  [ ] NO           Patient is a 53y old  Female who presents with a chief complaint of   HPI:  Pt is (reportedly) a 30 y/o F with autism and asthma? who is BIBA after respiratory (cardiac?) arrest. Reportedly the pt's name is Marysol Carlton and lives at [address] [place] [address]. Pt was intubated in the field by EMS. Collateral history obtained from multiple sources. The ED reports that pt received one push of epi in the field, they are unsure if the patient was actually in cardiac arrest or not when ems arrived at the scene, unsure if pt had undergone any chest compressions. More hx obtained from pt's neighbor Ms. Troncoso ([phone], this number was obtained from sue elliott-see below). Ms. Troncoso states that early this morning the pt was frantically knocking on her door, when Ms troncoso opened the door, the pt she was not wearing any clothes and was in severe respiratory distress saying that she couldn't breathe. Ms. Troncoso states that the pt started turning blue and collapsed on the floor, she called ems, she states that she believes that she was not breathing for at least 5 minutes before ems arrived. When ems arrived they intubated her and "gave her some medication through the arm" (presumably epi?). Ms. troncoso is not sure if they actually did any chest compressions on her as she was crying and distraught and was not entirely able to paying attention. Ms. Troncoso states that the only family she is aware of that the pt has is a cousin and a brother named Julian carlton (does not know his number), she provides the office number for the apartment complex where they live and states that they might have more info: [phone]. Ms. Troncoso is unsure of any other of the pt's pmh or is she uses any substances and states that she generally "stays out of her business." Additional collateral history obtained from Sue Elliott ([phone]) who had been in the ED earlier in the day and provided the ED with her number. Ms. Rogers states that she is a family friend of the pt and describes herself as a "grandmother" to the pt's 6 year old daughter (who is also autistic). Ms. Rogers states that the pt is autistic and believes she has asthma, does not know if she takes any medications and is unsure if she has any other pmh, she does not know the numbers to the pt's brother or cousin. Ms. Rogers provided the phone number for the pt's neighbor above.     In the ED, pt given fluids and solumedrol, started on a propofol and versed drip  Vitals: Vital Signs Last 24 Hrs  T(C): 36.6 (2022 12:47), Max: 36.6 (:47)  T(F): 97.9 (:), Max: 97.9 (:47)  HR: 82 (:) (78 - 123)  BP: 108/60 (:47) (94/68 - 147/71)  BP(mean): 77 (2022 12:00) (76 - 99)  RR: 20 (:) (14 - 20)  SpO2: 99% (:47) (99% - 100%)    Parameters below as of :47  Patient On (Oxygen Delivery Method): ventilator    Labs: remarkbale for wbc 21    Imaging:   < from: CT Angio Chest PE Protocol w/ IV Cont (22 @ 09:02) >  IMPRESSION:    1.  No evidence of a pulmonary embolism.    2.  Endotracheal tube is in satisfactory position. Minimal secretions in   the distal trachea and right mainstem bronchus.    < end of copied text >  < from: CT Head No Cont (22 @ 09:01) >  IMPRESSION:    Diffuse effacement of the bilateral cerebral/cerebellar sulci and   scattered blurring of gray-white matter differentiation. The finding   could reflect global hypoxic/ischemic injury.    No intracranial hemorrhage or midline shift.    < end of copied text >  < from: Xray Chest 1 View-PORTABLE IMMEDIATE (Xray Chest 1 View-PORTABLE IMMEDIATE .) (22 @ 06:53) >  Impression:    No radiographic evidence of acute cardiopulmonary disease.    ETT.           INTERVAL HPI/OVERNIGHT EVENTS:   No overnight events, patient resting comfortably on room air. Only reports feeling very nervous about her daughter being left alone.   Afebrile, hemodynamically stable     Subjective:    ICU Vital Signs Last 24 Hrs  T(C): 36.5 (02 Dec 2022 00:00), Max: 36.6 (01 Dec 2022 20:00)  T(F): 97.7 (02 Dec 2022 00:00), Max: 97.9 (01 Dec 2022 20:00)  HR: 117 (02 Dec 2022 05:00) (64 - 117)  BP: 118/63 (02 Dec 2022 05:00) (91/53 - 118/63)  BP(mean): 84 (02 Dec 2022 05:00) (67 - 86)  ABP: --  ABP(mean): --  RR: 38 (02 Dec 2022 05:) (11 - 38)  SpO2: 96% (02 Dec 2022 05:00) (94% - 100%)    O2 Parameters below as of 02 Dec 2022 05:00  Patient On (Oxygen Delivery Method): nasal cannula  O2 Flow (L/min): 3        I&O's Summary    2022 07:01  -  01 Dec 2022 07:00  --------------------------------------------------------  IN: 399.2 mL / OUT: 1575 mL / NET: -1175.8 mL    01 Dec 2022 07:01  -  02 Dec 2022 06:35  --------------------------------------------------------  IN: 845.5 mL / OUT: 1740 mL / NET: -894.5 mL      Mode: CPAP with PS  FiO2: 40  PEEP: 8  PS: 5  MAP: 11  PIP: 17      Daily     Daily Weight in k.4 (02 Dec 2022 05:00)    Adult Advanced Hemodynamics Last 24 Hrs  CVP(mm Hg): --  CVP(cm H2O): --  CO: --  CI: --  PA: --  PA(mean): --  PCWP: --  SVR: --  SVRI: --  PVR: --  PVRI: --    EKG/Telemetry Events:    MEDICATIONS  (STANDING):  albuterol    90 MICROgram(s) HFA Inhaler 2 Puff(s) Inhalation Once  chlorhexidine 0.12% Liquid 15 milliLiter(s) Oral Mucosa every 12 hours  chlorhexidine 2% Cloths 1 Application(s) Topical <User Schedule>  dexMEDEtomidine Infusion 0.2 MICROgram(s)/kG/Hr (5 mL/Hr) IV Continuous <Continuous>  enoxaparin Injectable 40 milliGRAM(s) SubCutaneous every 24 hours  methylPREDNISolone sodium succinate Injectable 60 milliGRAM(s) IV Push every 8 hours  propofol Infusion 10 MICROgram(s)/kG/Min (6 mL/Hr) IV Continuous <Continuous>    MEDICATIONS  (PRN):      PHYSICAL EXAM:  GENERAL: Sitting up in bed, appears nervous, in no acute distress  HEAD:  Atraumatic, Normocephalic  EYES: PERRL, small subconjunctival hemorrhages bilaterally  NECK: Supple, No JVD, Normal thyroid, no enlarged nodes  CHEST/LUNG: B/L good air entry; No rales, rhonchi, or wheezing, no increased WOB, no retractions  HEART: S1S2 normal, no S3, Regular rate and rhythm; No murmurs  ABDOMEN: Soft, Nontender, Nondistended; Bowel sounds present  EXTREMITIES:  2+ Peripheral Pulses, No clubbing, cyanosis, or edema  LYMPH: No lymphadenopathy noted  SKIN: No rashes or lesions  NERVOUS SYSTEM:  AOx3 in no acute distress, moving all extremities, no focal deficits    LABS:                        13.7   22.67 )-----------( 372      ( 02 Dec 2022 04:53 )             42.3     12-    139  |  103  |  15  ----------------------------<  121<H>  4.5   |  25  |  0.6<L>    Ca    9.0      02 Dec 2022 04:53  Mg     2.3     12-    TPro  6.8  /  Alb  4.3  /  TBili  0.4  /  DBili  x   /  AST  16  /  ALT  13  /  AlkPhos  84  12-    LIVER FUNCTIONS - ( 02 Dec 2022 04:53 )  Alb: 4.3 g/dL / Pro: 6.8 g/dL / ALK PHOS: 84 U/L / ALT: 13 U/L / AST: 16 U/L / GGT: x             CAPILLARY BLOOD GLUCOSE        ABG - ( 01 Dec 2022 10:36 )  pH, Arterial: 7.37  pH, Blood: x     /  pCO2: 44    /  pO2: 98    / HCO3: 25    / Base Excess: -0.2  /  SaO2: x                 Troponin T, Serum: <0.01 ng/mL ( @ 09:56)    CARDIAC MARKERS ( 01 Dec 2022 09:56 )  x     / <0.01 ng/mL / x     / x     / x          Urinalysis Basic - ( 2022 10:21 )    Color: Light Yellow / Appearance: Clear / S.044 / pH: x  Gluc: x / Ketone: Negative  / Bili: Negative / Urobili: <2 mg/dL   Blood: x / Protein: Trace / Nitrite: Negative   Leuk Esterase: Negative / RBC: x / WBC x   Sq Epi: x / Non Sq Epi: x / Bacteria: x        RADIOLOGY & ADDITIONAL TESTS:  EEG   Abnormal due to the presence of: generalized slowing as above,  Focal slowing as above  interictal activity as above     MRI Shows:  Limited examination due to motion artifact. Please note there is no   contrast on board on postcontrast sequences possibly due to faulty IV   line.No evidence of acute infarct, intracranial hemorrhage, or midline shift.  Reexpansion of the bilateral cerebral sulci since the prior CT head from   2022 likely representing decreased cerebral edema.      Care Discussed with Consultants/Other Providers [ x] YES  [ ] NO

## 2022-12-02 NOTE — PROGRESS NOTE ADULT - SUBJECTIVE AND OBJECTIVE BOX
Patient is a 53y old  Female who presents with a chief complaint of       Over Night Events:  did well s/p extubation yesterday, doing well on RA      ROS:     All ROS are negative except HPI       PHYSICAL EXAM    ICU Vital Signs Last 24 Hrs  T(C): 36.9 (02 Dec 2022 08:00), Max: 36.9 (02 Dec 2022 08:00)  T(F): 98.4 (02 Dec 2022 08:00), Max: 98.4 (02 Dec 2022 08:00)  HR: 121 (02 Dec 2022 08:00) (66 - 121)  BP: 120/61 (02 Dec 2022 08:00) (91/53 - 120/61)  BP(mean): 85 (02 Dec 2022 08:00) (67 - 85)  ABP: --  ABP(mean): --  RR: 27 (02 Dec 2022 08:00) (11 - 38)  SpO2: 95% (02 Dec 2022 08:00) (94% - 100%)    O2 Parameters below as of 02 Dec 2022 06:00  Patient On (Oxygen Delivery Method): room air            Constitutional: no acute distress, well nourished well developed  Neuro: moving all 4 limbs spontaneously, no facial droop or dysarthria  HEENT: NCAT, anicteric  Neck: no visible lymphadenopathy or goiter  Pulm: no respiratory distress. clear to auscultation bilaterally  Cardiac: extremities appear pink and well-perfused.  regular rhythm and rate, no murmur detected  Abdomen: non-distended  Extremities: no peripheral edema      22 @ 07:01  -  22 @ 07:00  --------------------------------------------------------  IN:    Dexmedetomidine: 167.5 mL    Oral Fluid: 798 mL  Total IN: 965.5 mL    OUT:    FentaNYL: 0 mL    Indwelling Catheter - Urethral (mL): 2090 mL    Midazolam: 0 mL  Total OUT: 2090 mL    Total NET: -1124.5 mL      22 @ 07:01  -  22 @ 08:44  --------------------------------------------------------  IN:  Total IN: 0 mL    OUT:    Indwelling Catheter - Urethral (mL): 350 mL  Total OUT: 350 mL    Total NET: -350 mL          LABS:                            13.7   22.67 )-----------( 372      ( 02 Dec 2022 04:53 )             42.3                                               12    139  |  103  |  15  ----------------------------<  121<H>  4.5   |  25  |  0.6<L>    Ca    9.0      02 Dec 2022 04:53  Mg     2.3     12    TPro  6.8  /  Alb  4.3  /  TBili  0.4  /  DBili  x   /  AST  16  /  ALT  13  /  AlkPhos  84  12                                             Urinalysis Basic - ( 2022 10:21 )    Color: Light Yellow / Appearance: Clear / S.044 / pH: x  Gluc: x / Ketone: Negative  / Bili: Negative / Urobili: <2 mg/dL   Blood: x / Protein: Trace / Nitrite: Negative   Leuk Esterase: Negative / RBC: x / WBC x   Sq Epi: x / Non Sq Epi: x / Bacteria: x        CARDIAC MARKERS ( 01 Dec 2022 09:56 )  x     / <0.01 ng/mL / x     / x     / x                                                LIVER FUNCTIONS - ( 02 Dec 2022 04:53 )  Alb: 4.3 g/dL / Pro: 6.8 g/dL / ALK PHOS: 84 U/L / ALT: 13 U/L / AST: 16 U/L / GGT: x                                                  Culture - Urine (collected 2022 10:21)  Source: Catheterized Catheterized  Final Report (01 Dec 2022 22:00):    No growth    Culture - Blood (collected 2022 08:45)  Source: .Blood Blood  Preliminary Report (01 Dec 2022 23:02):    No growth to date.    Culture - Blood (collected 2022 08:30)  Source: .Blood Blood  Preliminary Report (01 Dec 2022 23:02):    No growth to date.                                                   Mode: CPAP with PS  FiO2: 40  PEEP: 8  PS: 5  MAP: 11  PIP: 17                                      ABG - ( 01 Dec 2022 10:36 )  pH, Arterial: 7.37  pH, Blood: x     /  pCO2: 44    /  pO2: 98    / HCO3: 25    / Base Excess: -0.2  /  SaO2: x                   MEDICATIONS  (STANDING):  albuterol    90 MICROgram(s) HFA Inhaler 2 Puff(s) Inhalation Once  chlorhexidine 0.12% Liquid 15 milliLiter(s) Oral Mucosa every 12 hours  chlorhexidine 2% Cloths 1 Application(s) Topical <User Schedule>  dexMEDEtomidine Infusion 0.2 MICROgram(s)/kG/Hr (5 mL/Hr) IV Continuous <Continuous>  enoxaparin Injectable 40 milliGRAM(s) SubCutaneous every 24 hours  methylPREDNISolone sodium succinate Injectable 60 milliGRAM(s) IV Push every 8 hours  propofol Infusion 10 MICROgram(s)/kG/Min (6 mL/Hr) IV Continuous <Continuous>    MEDICATIONS  (PRN):      New X-rays reviewed:       ECHO reviewed    CXR interpreted by me:

## 2022-12-02 NOTE — SWALLOW BEDSIDE ASSESSMENT ADULT - SLP PERTINENT HISTORY OF CURRENT PROBLEM
?52 yo female hx of asthma BIBEMS 2/2 respiratory arrest, as per EMS, patient went to her neighbor and knocking on the door and asking for help. Patient then collapsed. EMS intubated patient on scene. s/p extubation 12/1. CTH revealed effacement and blurring of gray-white matter. Finding could reflect global hypoxic/ischemic injury. CXR negative

## 2022-12-02 NOTE — PROGRESS NOTE ADULT - ASSESSMENT
IMPRESSION:  Acute hypoxemic respiratory failure  Severe asthma exacerbation?  Respiratory arrest, unclear cause  anoxic brain injury      PLAN:    CNS/Psych:   Stop Keppra  Appreciate neuro reccs  epileptiform activity noted on routine EEG  Identify patient in system, Angelica Carlton, ensure patient's child is in safe custody, discuss w/ social work    HEENT: Oral care    PULMONARY:  HOB @ 45 degrees.    solumedrol 60 Q12 hr, albuterol nebs q 4 hr   Start LABA/ICS PRN per YOUSIF 2019  Continue Singulair    CARDIOVASCULAR: MAP adequate, avoid volume overload    GI: Stop GI prophylaxis.  Diet as tolerated.  Bowel regimen PRN    RENAL:  Follow up lytes.  Correct as needed. Little out    INFECTIOUS DISEASE: Follow up cultures, Full RVP    HEMATOLOGICAL:  DVT prophylaxis. Ambulate as well    ENDOCRINE:  Follow up FS.  Insulin protocol if needed.    MUSCULOSKELETAL: Ambulate and OOB as tolerated    Plan for d/c today  pending neuro reccs  Close follow-up with PCP and Pulmonary       IMPRESSION:  Acute hypoxemic respiratory failure  Severe asthma exacerbation?  Respiratory arrest, unclear cause  anoxic brain injury      PLAN:    CNS/Psych:   Stop Keppra  Appreciate neuro reccs  epileptiform activity noted on routine EEG  Identify patient in system, Angelica Carlton, ensure patient's child is in safe custody, discuss w/ social work    HEENT: Oral care    PULMONARY:  HOB @ 45 degrees.    solumedrol 60 Q12 hr, albuterol nebs q 4 hr   Start LABA/ICS PRN per YOUSIF 2019  Continue Singulair    CARDIOVASCULAR: MAP adequate, avoid volume overload    GI: Stop GI prophylaxis.  Diet as tolerated.  Bowel regimen PRN    RENAL:  Follow up lytes.  Correct as needed. Little out    INFECTIOUS DISEASE: Follow up cultures, Full RVP    HEMATOLOGICAL:  DVT prophylaxis. Ambulate as well    ENDOCRINE:  Follow up FS.  Insulin protocol if needed.    MUSCULOSKELETAL: Ambulate and OOB as tolerated    Plan for d/c today, I spent ~35 minutes coordinating discharge including:  pending neuro reccs  Close follow-up with PCP and Pulmonary  Complete 5-day course of 50mg Prednisone  Ensure new inhalers are ordered and covered by insurance  Transportation and social management
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
## 2022-12-02 NOTE — PROGRESS NOTE ADULT - ASSESSMENT
29F with autism and asthma? who is BIBA after respiratory (cardiac?) arrest. Patient was intubated on the scene by EMS, possible chest compressions. Neurocritical care Dr. Schwab evaluated the patient and does not think patient has anoxic brain injury. When patient was off sedation, she was awake and agitated.  Neurology was called to evaluate the patient. Patient was intubated and sedated on examiner's exam. CTH showed diffuse effacement of the bilateral cerebral/cerebellar sulci and scattered blurring of gray-white matter differentiation.     Impression  - Patient's initial exam was not consistent with anoxic injury as she was responsive and moving extremities with intact cranial nerves. She has since been weaned off sedation and extubated. Initial REEG as well as repeat EEG today show epileptiform activity. Due to EEG findings, it is best to evaluate further with additional neuroimaging.    Recommendations  - Obtain MRI Head w/wo  - Will hold off AEDs for now  - Neurology will follow 29F with autism and asthma? who is BIBA after respiratory (cardiac?) arrest. Patient was intubated on the scene by EMS, possible chest compressions. Neurocritical care Dr. Schwab evaluated the patient and does not think patient has anoxic brain injury. When patient was off sedation, she was awake and agitated.  Neurology was called to evaluate the patient. Patient was intubated and sedated on examiner's exam. CTH showed diffuse effacement of the bilateral cerebral/cerebellar sulci and scattered blurring of gray-white matter differentiation.     Impression  - Patient's initial exam was not consistent with anoxic injury as she was responsive and moving extremities with intact cranial nerves. She has since been weaned off sedation and extubated. Initial REEG as well as repeat EEG today show epileptiform activity. Due to EEG findings, it is best to evaluate further with additional neuroimaging.    Recommendations  - Obtain MRI Head w/wo  - Repeat EEG also showed the similar findings consistent with encephalitis   - Will hold off AEDs for now  - Neurology will follow

## 2022-12-02 NOTE — CONSULT NOTE ADULT - SUBJECTIVE AND OBJECTIVE BOX
HPI:  Pt is (reportedly) a 28 y/o F with autism and asthma? who is BIBA after respiratory (cardiac?) arrest. Reportedly the pt's name is Marysol Carlton and lives at [address] [place] [address]. Pt was intubated in the field by EMS. Collateral history obtained from multiple sources. The ED reports that pt received one push of epi in the field, they are unsure if the patient was actually in cardiac arrest or not when ems arrived at the scene, unsure if pt had undergone any chest compressions. More hx obtained from pt's neighbor Ms. Troncoso ([phone], this number was obtained from adam elliott-see below). Ms. Troncoso states that early this morning the pt was frantically knocking on her door, when Ms troncoso opened the door, the pt she was not wearing any clothes and was in severe respiratory distress saying that she couldn't breathe. Ms. Troncoso states that the pt started turning blue and collapsed on the floor, she called ems, she states that she believes that she was not breathing for at least 5 minutes before ems arrived. When ems arrived they intubated her and "gave her some medication through the arm" (presumably epi?). Ms. troncoso is not sure if they actually did any chest compressions on her as she was crying and distraught and was not entirely able to paying attention. Ms. Troncoso states that the only family she is aware of that the pt has is a cousin and a brother named Julian carlton (does not know his number), she [facility] and states that they might have more info: [phone]. Ms. Troncoso is unsure of any other of the pt's pmh or is she uses any substances and states that she generally "stays out of her business." Additional collateral history obtained from Adam Elliott ([phone]) who had been in the ED earlier in the day and provided the ED with her number. Ms. Rogers states that she is a family friend of the pt and describes herself as a "grandmother" to the pt's 6 year old daughter (who is also autistic). Ms. Rogers states that the pt is autistic and believes she has asthma, does not know if she takes any medications and is unsure if she has any other pmh, she does not know the numbers to the pt's brother or cousin. Ms. Rogers provided the phone number for the pt's neighbor above.     In the ED, pt given fluids and solumedrol, started on a propofol and versed drip  Vitals: Vital Signs Last 24 Hrs  T(C): 36.6 (2022 12:47), Max: 36.6 (2022 12:47)  T(F): 97.9 (:47), Max: 97.9 (2022 12:47)  HR: 82 (:) (78 - 123)  BP: 108/60 (:47) (94/68 - 147/71)  BP(mean): 77 (2022 12:00) (76 - 99)  RR: 20 (:) (14 - 20)  SpO2: 99% (:47) (99% - 100%)    Parameters below as of :47  Patient On (Oxygen Delivery Method): ventilator    Labs: remarkbale for wbc 21    Imaging:   < from: CT Angio Chest PE Protocol w/ IV Cont (22 @ 09:02) >  IMPRESSION:    1.  No evidence of a pulmonary embolism.    2.  Endotracheal tube is in satisfactory position. Minimal secretions in   the distal trachea and right mainstem bronchus.    < end of copied text >  < from: CT Head No Cont (22 @ 09:01) >  IMPRESSION:    Diffuse effacement of the bilateral cerebral/cerebellar sulci and   scattered blurring of gray-white matter differentiation. The finding   could reflect global hypoxic/ischemic injury.    No intracranial hemorrhage or midline shift.    < end of copied text >  < from: Xray Chest 1 View-PORTABLE IMMEDIATE (Xray Chest 1 View-PORTABLE IMMEDIATE .) (22 @ 06:53) >  Impression:    No radiographic evidence of acute cardiopulmonary disease.    ETT.    < end of copied text >            ?54 yo female hx of asthma BIBA 2/2 respiratory arrest. as per EMS, patient went to her neighbor and knocking on the door and asking for help. Patient then collapsed. Neighbor said patient had hx of asthma. EMS intubated patient on scene and brought patient to ED for evaluation.     CNS/Psych:   >>Appreciate neuro reccs-- D/C Pablo, routine EEG on minimal sedation, possibly repeat CTH when extubated  >>off sedation now extubated    PULMONARY:  HOB @ 45 degrees.    >> extubated successfully today after passing SBT with normal abg  >>Satting well, comfortable on room air  >>c/w solumedrol 60 Q8 hr, albuterol nebs     CARDIOVASCULAR:   >>MAP adequate, avoid volume overload  >> TTE: LVEF 55-60%, mild TR, otherwise normal      PAST MEDICAL & SURGICAL HISTORY:  Asthma          Hospital Course:    TODAY'S SUBJECTIVE & REVIEW OF SYMPTOMS:     Constitutional WNL   Cardio WNL   Resp WNL   GI WNL  Heme WNL  Endo WNL  Skin WNL  MSK Weakness  Neuro WNL  Cognitive WNL  Psych WNL      MEDICATIONS  (STANDING):  albuterol    90 MICROgram(s) HFA Inhaler 2 Puff(s) Inhalation Once  chlorhexidine 0.12% Liquid 15 milliLiter(s) Oral Mucosa every 12 hours  chlorhexidine 2% Cloths 1 Application(s) Topical <User Schedule>  dexMEDEtomidine Infusion 0.2 MICROgram(s)/kG/Hr (5 mL/Hr) IV Continuous <Continuous>  enoxaparin Injectable 40 milliGRAM(s) SubCutaneous every 24 hours  methylPREDNISolone sodium succinate Injectable 60 milliGRAM(s) IV Push every 12 hours  propofol Infusion 10 MICROgram(s)/kG/Min (6 mL/Hr) IV Continuous <Continuous>    MEDICATIONS  (PRN):      FAMILY HISTORY:      Allergies    Allergy Status Unknown    Intolerances        SOCIAL HISTORY:    [  ] Etoh  [  ] Smoking  [  ] Substance abuse     Home Environment:  [   ] Home Alone  [ x  ] Lives with Family  [   ] Home Health Aid    Dwelling:  [ x  ] Apartment  [   ] Private House  [   ] Adult Home  [   ] Skilled Nursing Facility      [   ] Short Term  [   ] Long Term  [  x ] Stairs       Elevator [ x  ]    FUNCTIONAL STATUS PTA: (Check all that apply)  Ambulation: [ x   ]Independent    [   ] Dependent     [   ] Non-Ambulatory  Assistive Device: [   ] SA Cane  [   ]  Q Cane  [   ] Walker  [   ]  Wheelchair  ADL : [ x  ] Independent  [    ]  Dependent       Vital Signs Last 24 Hrs  T(C): 36.9 (02 Dec 2022 08:00), Max: 36.9 (02 Dec 2022 08:00)  T(F): 98.4 (02 Dec 2022 08:00), Max: 98.4 (02 Dec 2022 08:00)  HR: 121 (02 Dec 2022 08:00) (69 - 121)  BP: 120/61 (02 Dec 2022 08:00) (91/53 - 120/61)  BP(mean): 85 (02 Dec 2022 08:00) (67 - 85)  RR: 27 (02 Dec 2022 08:00) (11 - 38)  SpO2: 95% (02 Dec 2022 08:00) (94% - 100%)    Parameters below as of 02 Dec 2022 06:00  Patient On (Oxygen Delivery Method): room air          PHYSICAL EXAM: Awake & Alert  GENERAL: NAD  HEAD:  Normocephalic  CHEST/LUNG: Clear   HEART: S1S2+  ABDOMEN: Soft, Nontender  EXTREMITIES:  no calf tenderness    NERVOUS SYSTEM:  Cranial Nerves 2-12 intact [   ] Abnormal  [   ]  ROM: WFL all extremities [  x ]  Abnormal [   ]  Motor Strength: WFL all extremities  [  x ]  Abnormal [   ]  Sensation: intact to light touch [ x  ] Abnormal [   ]    FUNCTIONAL STATUS:  Bed Mobility: Independent [   ]  Supervision [ x  ]  Needs Assistance [   ]  N/A [   ]  Transfers: Independent [   ]  Supervision [  x ]  Needs Assistance [   ]  N/A [   ]   Ambulation: Independent [   ]  Supervision [x   ]  Needs Assistance [   ]  N/A [   ]  ADL: Independent [   ] Requires Assistance [   ] N/A [   ]      LABS:                        13.7   22.67 )-----------( 372      ( 02 Dec 2022 04:53 )             42.3     12-02    139  |  103  |  15  ----------------------------<  121<H>  4.5   |  25  |  0.6<L>    Ca    9.0      02 Dec 2022 04:53  Mg     2.3     12-    TPro  6.8  /  Alb  4.3  /  TBili  0.4  /  DBili  x   /  AST  16  /  ALT  13  /  AlkPhos  84  12-02      Urinalysis Basic - ( 2022 10:21 )    Color: Light Yellow / Appearance: Clear / S.044 / pH: x  Gluc: x / Ketone: Negative  / Bili: Negative / Urobili: <2 mg/dL   Blood: x / Protein: Trace / Nitrite: Negative   Leuk Esterase: Negative / RBC: x / WBC x   Sq Epi: x / Non Sq Epi: x / Bacteria: x        RADIOLOGY & ADDITIONAL STUDIES:

## 2022-12-03 ENCOUNTER — TRANSCRIPTION ENCOUNTER (OUTPATIENT)
Age: 32
End: 2022-12-03

## 2022-12-03 VITALS
HEART RATE: 75 BPM | TEMPERATURE: 98 F | RESPIRATION RATE: 16 BRPM | DIASTOLIC BLOOD PRESSURE: 77 MMHG | SYSTOLIC BLOOD PRESSURE: 114 MMHG

## 2022-12-03 LAB
GLUCOSE BLDC GLUCOMTR-MCNC: 159 MG/DL — HIGH (ref 70–99)
RAPID RVP RESULT: SIGNIFICANT CHANGE UP
SARS-COV-2 RNA SPEC QL NAA+PROBE: SIGNIFICANT CHANGE UP

## 2022-12-03 PROCEDURE — 95816 EEG AWAKE AND DROWSY: CPT | Mod: 26

## 2022-12-03 PROCEDURE — 99239 HOSP IP/OBS DSCHRG MGMT >30: CPT

## 2022-12-03 PROCEDURE — 99231 SBSQ HOSP IP/OBS SF/LOW 25: CPT | Mod: GC

## 2022-12-03 RX ORDER — METFORMIN HYDROCHLORIDE 850 MG/1
1 TABLET ORAL
Qty: 30 | Refills: 0
Start: 2022-12-03 | End: 2023-01-01

## 2022-12-03 RX ORDER — BUDESONIDE AND FORMOTEROL FUMARATE DIHYDRATE 160; 4.5 UG/1; UG/1
2 AEROSOL RESPIRATORY (INHALATION)
Qty: 30 | Refills: 0
Start: 2022-12-03 | End: 2023-01-01

## 2022-12-03 RX ORDER — MONTELUKAST 4 MG/1
1 TABLET, CHEWABLE ORAL
Qty: 30 | Refills: 0
Start: 2022-12-03 | End: 2023-01-01

## 2022-12-03 RX ORDER — PANTOPRAZOLE SODIUM 20 MG/1
1 TABLET, DELAYED RELEASE ORAL
Qty: 14 | Refills: 0
Start: 2022-12-03 | End: 2022-12-16

## 2022-12-03 RX ORDER — BUDESONIDE AND FORMOTEROL FUMARATE DIHYDRATE 160; 4.5 UG/1; UG/1
2 AEROSOL RESPIRATORY (INHALATION)
Refills: 0 | Status: DISCONTINUED | OUTPATIENT
Start: 2022-12-03 | End: 2022-12-03

## 2022-12-03 RX ORDER — ALBUTEROL 90 UG/1
2 AEROSOL, METERED ORAL
Qty: 30 | Refills: 0
Start: 2022-12-03 | End: 2023-01-01

## 2022-12-03 RX ORDER — PANTOPRAZOLE SODIUM 20 MG/1
40 TABLET, DELAYED RELEASE ORAL
Refills: 0 | Status: DISCONTINUED | OUTPATIENT
Start: 2022-12-03 | End: 2022-12-03

## 2022-12-03 RX ORDER — IPRATROPIUM/ALBUTEROL SULFATE 18-103MCG
3 AEROSOL WITH ADAPTER (GRAM) INHALATION
Qty: 360 | Refills: 0
Start: 2022-12-03 | End: 2023-01-01

## 2022-12-03 RX ADMIN — Medication 60 MILLIGRAM(S): at 06:17

## 2022-12-03 RX ADMIN — CHLORHEXIDINE GLUCONATE 15 MILLILITER(S): 213 SOLUTION TOPICAL at 06:17

## 2022-12-03 RX ADMIN — PANTOPRAZOLE SODIUM 40 MILLIGRAM(S): 20 TABLET, DELAYED RELEASE ORAL at 09:38

## 2022-12-03 RX ADMIN — ENOXAPARIN SODIUM 40 MILLIGRAM(S): 100 INJECTION SUBCUTANEOUS at 06:17

## 2022-12-03 RX ADMIN — CHLORHEXIDINE GLUCONATE 1 APPLICATION(S): 213 SOLUTION TOPICAL at 06:17

## 2022-12-03 NOTE — DISCHARGE NOTE PROVIDER - NSDCACTIVITY_GEN_ALL_CORE
Bathing allowed/Showering allowed/Walking - Indoors allowed/No heavy lifting/straining/Walking - Outdoors allowed

## 2022-12-03 NOTE — DISCHARGE NOTE PROVIDER - NSDCMRMEDTOKEN_GEN_ALL_CORE_FT
albuterol 90 mcg/inh inhalation aerosol: 2 puff(s) inhaled once  budesonide-formoterol 160 mcg-4.5 mcg/inh inhalation aerosol: 2 puff(s) inhaled 2 times a day   ipratropium-albuterol 0.5 mg-2.5 mg/3 mL inhalation solution: 3 milliliter(s) inhaled 4 times a day, As Needed  metFORMIN 500 mg oral tablet, extended release: 1 tab(s) orally once a day  montelukast 10 mg oral tablet: 1 tab(s) orally once a day  pantoprazole 40 mg oral delayed release tablet: 1 tab(s) orally once a day (before a meal)  predniSONE 50 mg oral tablet: 1 tab(s) orally once a day

## 2022-12-03 NOTE — PROGRESS NOTE ADULT - SUBJECTIVE AND OBJECTIVE BOX
Neurology Progress Note    Interval History:    Patient seen and examined at bedside. There were no acute events overnight. Patient states she feels well, denied any complaints.      PAST MEDICAL & SURGICAL HISTORY:  Asthma      Medications:  albuterol    90 MICROgram(s) HFA Inhaler 2 Puff(s) Inhalation Once  budesonide 160 MICROgram(s)/formoterol 4.5 MICROgram(s) Inhaler 2 Puff(s) Inhalation two times a day  chlorhexidine 0.12% Liquid 15 milliLiter(s) Oral Mucosa every 12 hours  chlorhexidine 2% Cloths 1 Application(s) Topical <User Schedule>  enoxaparin Injectable 40 milliGRAM(s) SubCutaneous every 24 hours  methylPREDNISolone sodium succinate Injectable 60 milliGRAM(s) IV Push every 12 hours  pantoprazole    Tablet 40 milliGRAM(s) Oral before breakfast      Vital Signs Last 24 Hrs  T(C): 36.4 (03 Dec 2022 13:00), Max: 36.7 (02 Dec 2022 20:47)  T(F): 97.6 (03 Dec 2022 13:00), Max: 98 (02 Dec 2022 20:47)  HR: 75 (03 Dec 2022 13:00) (74 - 93)  BP: 114/77 (03 Dec 2022 13:00) (110/60 - 121/70)  BP(mean): 82 (02 Dec 2022 18:00) (82 - 82)  RR: 16 (03 Dec 2022 13:00) (10 - 18)  SpO2: 97% (02 Dec 2022 18:00) (97% - 97%)    Parameters below as of 02 Dec 2022 18:00  Patient On (Oxygen Delivery Method): room air      Neurological Examination:  General: Appearance is consistent with chronologic age. No abnormal facies. Gross skin survey within normal limits.    Cognitive/Language: Awake, alert, and oriented to person, place, time and date. Recent and remote memory intact. Fund of knowledge is appropriate. Naming, repetition and comprehension intact. Nondysarthric.    Cranial Nerves  - Eyes: Visual acuity intact, visual fields full. EOMI w/o nystagmus, skew or reported double vision. PERRL. No ptosis/weakness of eyelid closure.    - Face: Facial sensation normal V1 - V3, no facial asymmetry.    - Ears/Nose/Throat: Hearing grossly intact  Motor examination: Upper Extremities: L 5/5, R 5/5; Lower extremities: L 5/5, R 5/5. No observable drift. Normal tone and bulk. No tenderness, twitching, tremors or involuntary movements.  Sensory examination: Intact to light touch throughout  Reflexes: 2+ B/L biceps, triceps, brachioradialis, patella and achilles.   Cerebellum: FTN intact      Labs:  CBC Full  -  ( 02 Dec 2022 04:53 )  WBC Count : 22.67 K/uL  RBC Count : 4.74 M/uL  Hemoglobin : 13.7 g/dL  Hematocrit : 42.3 %  Platelet Count - Automated : 372 K/uL  Mean Cell Volume : 89.2 fL  Mean Cell Hemoglobin : 28.9 pg  Mean Cell Hemoglobin Concentration : 32.4 g/dL  Auto Neutrophil # : 20.76 K/uL  Auto Lymphocyte # : 1.18 K/uL  Auto Monocyte # : 0.54 K/uL  Auto Eosinophil # : 0.00 K/uL  Auto Basophil # : 0.03 K/uL  Auto Neutrophil % : 91.6 %  Auto Lymphocyte % : 5.2 %  Auto Monocyte % : 2.4 %  Auto Eosinophil % : 0.0 %  Auto Basophil % : 0.1 %    12-02    139  |  103  |  15  ----------------------------<  121<H>  4.5   |  25  |  0.6<L>    Ca    9.0      02 Dec 2022 04:53  Mg     2.3     12-02    TPro  6.8  /  Alb  4.3  /  TBili  0.4  /  DBili  x   /  AST  16  /  ALT  13  /  AlkPhos  84  12-02    LIVER FUNCTIONS - ( 02 Dec 2022 04:53 )  Alb: 4.3 g/dL / Pro: 6.8 g/dL / ALK PHOS: 84 U/L / ALT: 13 U/L / AST: 16 U/L / GGT: x             < from: MR Head w/ w/o IV Cont (12.02.22 @ 15:02) >  PROCEDURE DATE:  12/02/2022          INTERPRETATION:  CLINICAL INDICATION: Anoxic brain injury.    TECHNIQUE: Multi-planar multi-sequential MR imaging of the brain was   performed before and after the intravenous administration of 10 ml of   Gadavist. However there is no contrast on board for the postcontrast   sequences possibly due to faulty IV line.    COMPARISON: Correlated with the CT head dated 11/30/2022.    FINDINGS:    Thereis moderate motion artifact degrading diagnostic interpretation.    There has been reexpansion of the bilateral cerebral sulci since the   prior CT head from 11/20/2022. Evaluation of the basal ganglia is limited    No acute infarction, intracranial hemorrhage or mass.    There is no evidence of hydrocephalus. There are no extra-axial fluid   collections. The skull base flow voids are present.    The visualized intraorbital contents are normal. Mild mucosal thickening   in bilateral ethmoid sinuses. The mastoid air cells are clear. The   visualized soft tissues and osseous structures appear normal.      IMPRESSION:    Limited examination due to motion artifact. Please note there is no   contrast on board on postcontrast sequences possibly due to faulty IV   line.    No evidence of acute infarct, intracranial hemorrhage, or midline shift.    Reexpansion of the bilateral cerebral sulci since the prior CT head from   11/20/2022 likely representing decreased cerebral edema.    < end of copied text >      < from: EEG (12.02.22 @ 11:00) >  PROCEDURE DATE:  12/02/2022          INTERPRETATION:  Exam Performed on: 16 Channel Digital Routine EEG done   on [employer] recording system.    History  Possible Seizures  Asthma, Cardiac Aresst, Respiratory Destress    Medications    Medication  Date  Peridex  2022/12/02  Methylprednisolone  2022/12/02    State  Awake    Symmetry  Symmetric    Organization  Less than optimally organized    PDR  Continuous  Background: 6-7 hz    Generalized Slowing  Yes  mild - moderate    Focal Slowing  No    Breach Artifact:  No    Activation Procedure  Hyper Ventilation  No    Photic Stimulation  No    Epileptiform Activity  Yes    Frequency:  frequent    Side:  Generalized    Type:  3Hz Kane and wave complexes at frequency 3 Hz    Area of Activity:  Higher frontal amplitude    Events:  No    Impression:  Abnormal due to the presence of: generalized slowing as above, interictal   activity as above      Clinical Correlation & Recommendations  Consistent with diffuse cerebral electrophysiological dysfunction.    Secondary to nonspecific cause. Consistent with potential for generalized   seizure.
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
## 2022-12-03 NOTE — DISCHARGE NOTE PROVIDER - NSDCCPCAREPLAN_GEN_ALL_CORE_FT
PRINCIPAL DISCHARGE DIAGNOSIS  Diagnosis: Respiratory failure  Assessment and Plan of Treatment: You were evaluated in the hospital for your asthma exacerbation which was severe enough to require intubation for 2 days. In the meantime you also had imaging done of your head including CAT scan and MRI which showed temporary brain swelling, which is concerning. You were recommended to have a video EEG to assess for seizures and a lumbar puncture to evaluate for infection in the spine. You declined these studies and opted to be discharged instead, with close follow up with the Neurologists here.   On discharge:  - Take all your routinely prescribed medications  - Follow up with Neurology in 1 week - call to make appointment   - Follow up with your Primary Care Provider in 1 week   Please follow up with your primary care provider and Neurology by calling them to make an appointment so that you can see them in 1-2weeks; bring your paperwork from this hospital stay to that visit.  In the meantime please take all the medications you were discharged with, unless otherwise instructed by your healthcare provider(s).   Seek immediate medical attention if you develop fevers, chills, chest pain, shortness of breath, nausea and vomiting, abdominal pain, passing out, weakness or numbness or tingling on one side of your body, or any other concerning signs or symptoms.      SECONDARY DISCHARGE DIAGNOSES  Diagnosis: Anoxic brain injury  Assessment and Plan of Treatment:     Diagnosis: Respiratory failure  Assessment and Plan of Treatment:      PRINCIPAL DISCHARGE DIAGNOSIS  Diagnosis: Respiratory failure  Assessment and Plan of Treatment: You were evaluated in the hospital for your asthma exacerbation which was severe enough to require intubation for 2 days. In the meantime you also had imaging done of your head including CAT scan and MRI which showed temporary brain swelling, which is concerning. You were recommended to have a video EEG to assess for seizures and a lumbar puncture to evaluate for infection in the spine. You declined these studies and opted to be discharged instead, with close follow up with the Neurologists here.   On discharge:  - Take all your routinely prescribed medications  - Follow up with Neurology in 1 week - call to make appointment   - Follow up with Pulmonology Dr Hawkins in 1 week   - Follow up with your Primary Care Provider in 1 week   Please follow up with your primary care provider and Neurology by calling them to make an appointment so that you can see them in 1-2weeks; bring your paperwork from this hospital stay to that visit.  In the meantime please take all the medications you were discharged with, unless otherwise instructed by your healthcare provider(s).   Seek immediate medical attention if you develop fevers, chills, chest pain, shortness of breath, nausea and vomiting, abdominal pain, passing out, weakness or numbness or tingling on one side of your body, or any other concerning signs or symptoms.      SECONDARY DISCHARGE DIAGNOSES  Diagnosis: Anoxic brain injury  Assessment and Plan of Treatment:     Diagnosis: Respiratory failure  Assessment and Plan of Treatment:

## 2022-12-03 NOTE — DISCHARGE NOTE PROVIDER - PROVIDER TOKENS
PROVIDER:[TOKEN:[[id number]]] PROVIDER:[TOKEN:[[id number]]],PROVIDER:[TOKEN:[[id number]]],PROVIDER:[TOKEN:[[id number]]] PROVIDER:[TOKEN:[[id number]],FOLLOWUP:[1 week]],PROVIDER:[TOKEN:[[id number]],FOLLOWUP:[1 week]],PROVIDER:[TOKEN:[[id number]],FOLLOWUP:[1 week]]

## 2022-12-03 NOTE — PROGRESS NOTE ADULT - ASSESSMENT
28 yo F w/ PMHx of high functioning autism and asthma who is BIBA after respiratory (cardiac?) arrest. Patient was intubated on the scene by EMS, possible chest compressions admionistered. Neurocritical care Dr. Schwab evaluated the patient and does not think patient has anoxic brain injury. When patient was off sedation, she was awake and agitated. Neurology was called to evaluate the patient. CT Head showed diffuse effacement of the bilateral cerebral/cerebellar sulci and scattered blurring of gray-white matter differentiation. She has since been weaned off sedation and extubated. Initial REEG as well as repeat EEG showed epileptiform activity.     Plan:  - MRI Head w/ w/o reviewed, limited exam without evidence of acute abnormalities  - EEG showed mild to moderate generalized slowing, frequent epileptiform activity 3 Hz spike and wave complexes  - Recommend VEEG monitoring  - Recommend LP, f/u encephalitis panel  - Will hold off on AEDs for now  - Discussed options with patient and further work-up required  - If patient refuses, okay to follow up with Neurology outpatient in 1 week 28 yo F w/ PMHx of high functioning autism and asthma who is BIBA after respiratory (cardiac?) arrest. Patient was intubated on the scene by EMS, possible chest compressions admionistered. Neurocritical care Dr. Schwab evaluated the patient and does not think patient has anoxic brain injury. When patient was off sedation, she was awake and agitated. Neurology was called to evaluate the patient. CT Head showed diffuse effacement of the bilateral cerebral/cerebellar sulci and scattered blurring of gray-white matter differentiation. She has since been weaned off sedation and extubated. Initial REEG as well as repeat EEG showed epileptiform activity.     Plan:  - MRI Head w/ w/o reviewed, limited exam without evidence of acute abnormalities  - EEG showed mild to moderate generalized slowing, frequent epileptiform activity 3 Hz spike and wave complexes  - Recommend VEEG monitoring but patient refused   - Recommend LP, f/u encephalitis panel  - Will hold off on AEDs for now  - Discussed options with patient and further work-up required  - If patient refuses, okay to follow up with Neurology outpatient in 1 week
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
## 2022-12-03 NOTE — DISCHARGE NOTE PROVIDER - ATTENDING DISCHARGE PHYSICAL EXAMINATION:
Patient seen and examined at bedside. She says she is feeling much better. Denies shortness of breath. I spoke with neuro, they said she is much improved from prior, but still recommend VEEG and LP. Patient is refusing, says she feels much better, and will follow as outpatient with neuro and pulm. Neuro agrees that she is much better and if she is able to follow up outpatient that's fine. Patient understands that workup is not complete and we are recommending these additional tests to rule out viral encephalitis, and she is able to understand the risks including but not limited to worsening mental status, seizure, coma, or even death. Witnessed by medical intern Dr. Tyler. Patient stable to be d/c with strict instructions to follow up as outpatient.       PHYSICAL EXAM:  GENERAL: Sitting up in bed, in no acute distress  HEAD:  Atraumatic, Normocephalic   EYES: PERRL, small subconjunctival hemorrhages bilaterally  NECK: Supple, No JVD, Normal thyroid, no enlarged nodes  CHEST/LUNG: B/L good air entry; No rales, rhonchi, or wheezing, no increased WOB, no retractions  HEART: S1S2 normal, no S3, Regular rate and rhythm; No murmurs  ABDOMEN: Soft, Nontender, Nondistended; Bowel sounds present  EXTREMITIES:  2+ Peripheral Pulses, No clubbing, cyanosis, or edema  LYMPH: No lymphadenopathy noted  SKIN: No rashes or lesions  NERVOUS SYSTEM:  AOx3 in no acute distress, moving all extremities, no focal deficits    Vital Signs Last 24 Hrs  T(C): 36.4 (03 Dec 2022 13:00), Max: 36.7 (02 Dec 2022 20:47)  T(F): 97.6 (03 Dec 2022 13:00), Max: 98 (02 Dec 2022 20:47)  HR: 75 (03 Dec 2022 13:00) (74 - 93)  BP: 114/77 (03 Dec 2022 13:00) (110/60 - 124/70)  BP(mean): 82 (02 Dec 2022 18:00) (82 - 88)  RR: 16 (03 Dec 2022 13:00) (10 - 20)  SpO2: 97% (02 Dec 2022 18:00) (96% - 97%)    Parameters below as of 02 Dec 2022 18:00  Patient On (Oxygen Delivery Method): room air

## 2022-12-03 NOTE — PROGRESS NOTE ADULT - ATTENDING COMMENTS
I visited the patient 12/3. Agree with exam findings and plan. Patient is alert and oriented. No focal deficit on exam. Denies any history of seizure in the past. Recommended to obtain VEEG and possible LP to check for any evidence of encephalitis. Patient wants to be discharged and wants to be discharges. I visited the patient 12/3. Agree with exam findings and plan. Patient is alert and oriented. No focal deficit on exam. Denies any history of seizure in the past. Recommended to obtain VEEG and possible LP to check for any evidence of encephalitis. Patient wants to be discharged. Given the fact that patient never had seizure further work up could also be done in outpatient setting. Recommended to follow up in one week in neurology clinic once the patient is discharge to discuss ambulatory 72 EEG.

## 2022-12-03 NOTE — DISCHARGE NOTE PROVIDER - CARE PROVIDER_API CALL
Fabien Lilly)  Neurology  [address] [place] [address]  Phone: [phone]  Fax: [phone]  Follow Up Time:    Fabien Lilly)  Neurology  [address] [place] [address]  Phone: [phone]  Fax: [phone]  Follow Up Time:     Leigh Stark)  Internal Medicine  [address] [place] [address], [address] [place] [id number]  Phone: [phone]  Fax: [phone]  Follow Up Time:     Mino Hawkins (DO)  Internal Medicine; Pulmonary Disease  [address] [place] [address]  Phone: [phone]  Fax: [phone]  Follow Up Time:    Fabien Lilly)  Neurology  [address] [place] [address]  Phone: [phone]  Fax: [phone]  Follow Up Time: 1 week    Leigh Stark)  Internal Medicine  [address] [place] [address], [address]  [place], [place] [id number]  Phone: [phone]  Fax: [phone]  Follow Up Time: 1 week    Mino Hawkins (DO)  Internal Medicine; Pulmonary Disease  [address] [place] [address]  Phone: [phone]  Fax: [phone]  Follow Up Time: 1 week

## 2022-12-03 NOTE — PROGRESS NOTE ADULT - SUBJECTIVE AND OBJECTIVE BOX
[place] CRITICAL 53y Female  MRN#: [id number]   CODE STATUS: FULL CODE     Admission Date: 11-30-22  Hospital Day: 3d    Pt is currently admitted with the primary diagnosis of hypoxic respiratory failure, ?asthma exacerbation, possible cardiac arrest     NOTE: AS PER 12/2/22 CCU RESIDENT PROG NOTE - DO NOT ALLOW VISITORS TO SEE PT  presumably 2/2 complex social situation     SUBJECTIVE  Hospital Course  30 y/o woman w PMHx autism, asthma, BIBA and admitted to MICU 11/30/22 as unidentified person after found down in front of neighbor's apartment, unclear if CPR performed in field, intubated from 11/30/22-12/1/22, w neg CTA PE protocol, CT Abd/Pelvis w/o intra-abd pathology, CT Head w "diffuse effacement of the sulci and blurring of gray-white matter differentiation, possibly reflecting global hypoxic/ischemic injury." Found to have complex social situation, currently w daughter admitted in peds for pt's social situation.     Per Progress note CCU resident 12/2/22  Reportedly the pt's name is Marysol Carlton and lives at [address] [place] [address]. Pt was intubated in the field by EMS. Collateral history obtained from multiple sources. The ED reports that pt received one push of epi in the field, they are unsure if the patient was actually in cardiac arrest or not when ems arrived at the scene, unsure if pt had undergone any chest compressions. More hx obtained from pt's neighbor Ms. Troncoso ([phone], this number was obtained from sue elliott-see below). Ms. Troncoso states that early this morning the pt was frantically knocking on her door, when Ms troncoso opened the door, the pt she was not wearing any clothes and was in severe respiratory distress saying that she couldn't breathe. Ms. Troncoso states that the pt started turning blue and collapsed on the floor, she called ems, she states that she believes that she was not breathing for at least 5 minutes before ems arrived. When ems arrived they intubated her and "gave her some medication through the arm" (presumably epi?). Ms. troncoso is not sure if they actually did any chest compressions on her as she was crying and distraught and was not entirely able to paying attention. Ms. Troncoso states that the only family she is aware of that the pt has is a cousin and a brother named Julian carlton (does not know his number), she provides the office number for the apartment complex where they live and states that they might have more info: [phone]. Ms. Troncoso is unsure of any other of the pt's pmh or is she uses any substances and states that she generally "stays out of her business." Additional collateral history obtained from Sue Elliott ([phone]) who had been in the ED earlier in the day and provided the ED with her number. Ms. Rogers states that she is a family friend of the pt and describes herself as a "grandmother" to the pt's 6 year old daughter (who is also autistic). Ms. Rogers states that the pt is autistic and believes she has asthma, does not know if she takes any medications and is unsure if she has any other pmh, she does not know the numbers to the pt's brother or cousin. Ms. Rogers provided the phone number for the pt's neighbor above.       Overnight events      Subjective complaints        T(C): 36.7 (12-02-22 @ 20:47)  T(F): 98 (12-02-22 @ 20:47)  HR: 86 (12-02-22 @ 20:47)  BP: 110/60 (12-02-22 @ 20:47)  RR: 18 (12-02-22 @ 20:47)  SpO2: 97% (12-02-22 @ 18:00)        Present Today:   - Hernandez:  No [  ], Yes [ X ] : Indication:   - Type of IV Access:       .. CVC/Piccline:  No [  ], Yes [  ] : Indication:       .. Midline: No [  ], Yes [  ] : Indication:                                              OBJECTIVE  PAST MEDICAL & SURGICAL HISTORY  Asthma                                                ALLERGIES:  Allergy Status Unknown                           HOME MEDICATIONS  Home Medications:                           MEDICATIONS:  STANDING MEDICATIONS  albuterol    90 MICROgram(s) HFA Inhaler 2 Puff(s) Inhalation Once  chlorhexidine 0.12% Liquid 15 milliLiter(s) Oral Mucosa every 12 hours  chlorhexidine 2% Cloths 1 Application(s) Topical <User Schedule>  enoxaparin Injectable 40 milliGRAM(s) SubCutaneous every 24 hours  methylPREDNISolone sodium succinate Injectable 60 milliGRAM(s) IV Push every 12 hours    PRN MEDICATIONS                                            ------------------------------------------------------------  T(C): 36.7 (12-02-22 @ 20:47), Max: 37 (12-02-22 @ 12:00)  T(F): 98 (12-02-22 @ 20:47), Max: 98.6 (12-02-22 @ 12:00)  HR: 86 (12-02-22 @ 20:47) (86 - 121)  BP: 110/60 (12-02-22 @ 20:47) (106/61 - 132/61)  RR: 18 (12-02-22 @ 20:47) (18 - 117)  SpO2: 97% (12-02-22 @ 18:00) (95% - 97%)      12-01-22 @ 07:01  -  12-02-22 @ 07:00  --------------------------------------------------------  IN: 965.5 mL / OUT: 2090 mL / NET: -1124.5 mL    12-02-22 @ 07:01  -  12-03-22 @ 05:51  --------------------------------------------------------  IN: 658 mL / OUT: 1215 mL / NET: -557 mL                                               LABS:                        13.7   22.67 )-----------( 372      ( 02 Dec 2022 04:53 )             42.3     12-02    139  |  103  |  15  ----------------------------<  121<H>  4.5   |  25  |  0.6<L>    Ca    9.0      02 Dec 2022 04:53  Mg     2.3     12-02    TPro  6.8  /  Alb  4.3  /  TBili  0.4  /  DBili  x   /  AST  16  /  ALT  13  /  AlkPhos  84  12-02        ABG - ( 01 Dec 2022 10:36 )  pH, Arterial: 7.37  pH, Blood: x     /  pCO2: 44    /  pO2: 98    / HCO3: 25    / Base Excess: -0.2  /  SaO2: x                       Culture - Urine (collected 30 Nov 2022 10:21)  Source: Catheterized Catheterized  Final Report (01 Dec 2022 22:00):    No growth    Culture - Blood (collected 30 Nov 2022 08:45)  Source: .Blood Blood  Preliminary Report (01 Dec 2022 23:02):    No growth to date.    Culture - Blood (collected 30 Nov 2022 08:30)  Source: .Blood Blood  Preliminary Report (01 Dec 2022 23:02):    No growth to date.        CARDIAC MARKERS ( 01 Dec 2022 09:56 )  x     / <0.01 ng/mL / x     / x     / x                  RADIOLOGY:         [place] CRITICAL 53y Female  MRN#: [id number]   CODE STATUS: FULL CODE     Admission Date: 11-30-22  Hospital Day: 3d     Pt is currently admitted with the primary diagnosis of hypoxic respiratory failure, ?asthma exacerbation, possible cardiac arrest     NOTE: AS PER 12/2/22 CCU RESIDENT PROG NOTE - DO NOT ALLOW VISITORS TO SEE PT  presumably 2/2 complex social situation     SUBJECTIVE  Hospital Course  30 y/o woman w PMHx autism, asthma, BIBA and admitted to MICU 11/30/22 as unidentified person after found down in front of neighbor's apartment, unclear if CPR performed in field, intubated from 11/30/22-12/1/22, w neg CTA PE protocol, CT Abd/Pelvis w/o intra-abd pathology, CT Head w "diffuse effacement of the sulci and blurring of gray-white matter differentiation, possibly reflecting global hypoxic/ischemic injury." Found to have complex social situation, currently w daughter admitted in peds for pt's social situation.     Per Progress note CCU resident 12/2/22  Reportedly the pt's name is Marysol Carlton and lives at [address] [place] [address]. Pt was intubated in the field by EMS. Collateral history obtained from multiple sources. The ED reports that pt received one push of epi in the field, they are unsure if the patient was actually in cardiac arrest or not when ems arrived at the scene, unsure if pt had undergone any chest compressions. More hx obtained from pt's neighbor Ms. Troncoso ([phone], this number was obtained from sue elliott-see below). Ms. Troncoso states that early this morning the pt was frantically knocking on her door, when Ms troncoso opened the door, the pt she was not wearing any clothes and was in severe respiratory distress saying that she couldn't breathe. Ms. Troncoso states that the pt started turning blue and collapsed on the floor, she called ems, she states that she believes that she was not breathing for at least 5 minutes before ems arrived. When ems arrived they intubated her and "gave her some medication through the arm" (presumably epi?). Ms. troncoso is not sure if they actually did any chest compressions on her as she was crying and distraught and was not entirely able to paying attention. Ms. Troncoso states that the only family she is aware of that the pt has is a cousin and a brother named Julian carlton (does not know his number), she provides the office number for the apartment complex where they live and states that they might have more info: [phone]. Ms. Troncoso is unsure of any other of the pt's pmh or is she uses any substances and states that she generally "stays out of her business." Additional collateral history obtained from Sue Elliott ([phone]) who had been in the ED earlier in the day and provided the ED with her number. Ms. Rogers states that she is a family friend of the pt and describes herself as a "grandmother" to the pt's 6 year old daughter (who is also autistic). Ms. Rogers states that the pt is autistic and believes she has asthma, does not know if she takes any medications and is unsure if she has any other pmh, she does not know the numbers to the pt's brother or cousin. Ms. Rogers provided the phone number for the pt's neighbor above.       Overnight events      Subjective complaints        T(C): 36.7 (12-02-22 @ 20:47)  T(F): 98 (12-02-22 @ 20:47)  HR: 86 (12-02-22 @ 20:47)  BP: 110/60 (12-02-22 @ 20:47)  RR: 18 (12-02-22 @ 20:47)  SpO2: 97% (12-02-22 @ 18:00)        Present Today:   - Hernandez:  No [  ], Yes [ X ] : Indication:   - Type of IV Access:       .. CVC/Piccline:  No [  ], Yes [  ] : Indication:       .. Midline: No [  ], Yes [  ] : Indication:                                              OBJECTIVE  PAST MEDICAL & SURGICAL HISTORY  Asthma                                                ALLERGIES:  Allergy Status Unknown                           HOME MEDICATIONS  Home Medications:                           MEDICATIONS:  STANDING MEDICATIONS  albuterol    90 MICROgram(s) HFA Inhaler 2 Puff(s) Inhalation Once  chlorhexidine 0.12% Liquid 15 milliLiter(s) Oral Mucosa every 12 hours  chlorhexidine 2% Cloths 1 Application(s) Topical <User Schedule>  enoxaparin Injectable 40 milliGRAM(s) SubCutaneous every 24 hours  methylPREDNISolone sodium succinate Injectable 60 milliGRAM(s) IV Push every 12 hours    PRN MEDICATIONS                                            ------------------------------------------------------------  T(C): 36.7 (12-02-22 @ 20:47), Max: 37 (12-02-22 @ 12:00)  T(F): 98 (12-02-22 @ 20:47), Max: 98.6 (12-02-22 @ 12:00)  HR: 86 (12-02-22 @ 20:47) (86 - 121)  BP: 110/60 (12-02-22 @ 20:47) (106/61 - 132/61)  RR: 18 (12-02-22 @ 20:47) (18 - 117)  SpO2: 97% (12-02-22 @ 18:00) (95% - 97%)      12-01-22 @ 07:01  -  12-02-22 @ 07:00  --------------------------------------------------------  IN: 965.5 mL / OUT: 2090 mL / NET: -1124.5 mL    12-02-22 @ 07:01  -  12-03-22 @ 05:51  --------------------------------------------------------  IN: 658 mL / OUT: 1215 mL / NET: -557 mL                                               LABS:                        13.7   22.67 )-----------( 372      ( 02 Dec 2022 04:53 )             42.3     12-02    139  |  103  |  15  ----------------------------<  121<H>  4.5   |  25  |  0.6<L>    Ca    9.0      02 Dec 2022 04:53  Mg     2.3     12-02    TPro  6.8  /  Alb  4.3  /  TBili  0.4  /  DBili  x   /  AST  16  /  ALT  13  /  AlkPhos  84  12-02        ABG - ( 01 Dec 2022 10:36 )  pH, Arterial: 7.37  pH, Blood: x     /  pCO2: 44    /  pO2: 98    / HCO3: 25    / Base Excess: -0.2  /  SaO2: x                       Culture - Urine (collected 30 Nov 2022 10:21)  Source: Catheterized Catheterized  Final Report (01 Dec 2022 22:00):    No growth    Culture - Blood (collected 30 Nov 2022 08:45)  Source: .Blood Blood  Preliminary Report (01 Dec 2022 23:02):    No growth to date.    Culture - Blood (collected 30 Nov 2022 08:30)  Source: .Blood Blood  Preliminary Report (01 Dec 2022 23:02):    No growth to date.        CARDIAC MARKERS ( 01 Dec 2022 09:56 )  x     / <0.01 ng/mL / x     / x     / x                  RADIOLOGY:

## 2022-12-03 NOTE — DISCHARGE NOTE NURSING/CASE MANAGEMENT/SOCIAL WORK - NSDCPEFALRISK_GEN_ALL_CORE
For information on Fall & Injury Prevention, visit: [email or web address] OR  [email or web address] OR  https://www.cdc.gov/steadi/patient.html regular rate and rhythm/normal PMI

## 2022-12-03 NOTE — DISCHARGE NOTE PROVIDER - NSDCFUADDINST_GEN_ALL_CORE_FT
You were evaluated in the hospital for your asthma exacerbation which was severe enough to require intubation for 2 days. In the meantime you also had imaging done of your head including CAT scan and MRI which showed temporary brain swelling, which is concerning. You were recommended to have a video EEG to assess for seizures and a lumbar puncture to evaluate for infection in the spine. You declined these studies and opted to be discharged instead, with close follow up with the Neurologists here.   On discharge: - Take all your routinely prescribed medications - Follow up with Neurology in 1 week - call to make appointment  - Follow up with your Primary Care Provider in 1 week   Please follow up with your primary care provider and Neurology by calling them to make an appointment so that you can see them in 1-2weeks; bring your paperwork from this hospital stay to that visit.  In the meantime please take all the medications you were discharged with, unless otherwise instructed by your healthcare provider(s).   Seek immediate medical attention if you develop fevers, chills, chest pain, shortness of breath, nausea and vomiting, abdominal pain, passing out, weakness or numbness or tingling on one side of your body, or any other concerning signs or symptoms.  You were evaluated in the hospital for your asthma exacerbation which was severe enough to require intubation for 2 days. In the meantime you also had imaging done of your head including CAT scan and MRI which showed temporary brain swelling, which is concerning. You were recommended to have a video EEG to assess for seizures and a lumbar puncture to evaluate for infection in the spine. You declined these studies and opted to be discharged instead, with close follow up with the Neurologists here.   On discharge: - Take all your routinely prescribed medications - Follow up with Neurology in 1 week - call to make appointment  - Follow up with Pulmonology Dr Hawkins in 1 week  - Follow up with your Primary Care Provider in 1 week    Please follow up with your primary care provider and Neurology by calling them to make an appointment so that you can see them in 1-2weeks; bring your paperwork from this hospital stay to that visit.  In the meantime please take all the medications you were discharged with, unless otherwise instructed by your healthcare provider(s).   Seek immediate medical attention if you develop fevers, chills, chest pain, shortness of breath, nausea and vomiting, abdominal pain, passing out, weakness or numbness or tingling on one side of your body, or any other concerning signs or symptoms.

## 2022-12-03 NOTE — DISCHARGE NOTE PROVIDER - CARE PROVIDERS DIRECT ADDRESSES
,DirectAddress_Unknown ,DirectAddress_Unknown,[email or web address],DirectAddress_Unknown

## 2022-12-05 LAB
CULTURE RESULTS: SIGNIFICANT CHANGE UP
CULTURE RESULTS: SIGNIFICANT CHANGE UP
SPECIMEN SOURCE: SIGNIFICANT CHANGE UP
SPECIMEN SOURCE: SIGNIFICANT CHANGE UP

## 2022-12-13 ENCOUNTER — APPOINTMENT (OUTPATIENT)
Dept: PULMONOLOGY | Facility: CLINIC | Age: 32
End: 2022-12-13

## 2022-12-15 DIAGNOSIS — J96.01 ACUTE RESPIRATORY FAILURE WITH HYPOXIA: ICD-10-CM

## 2022-12-15 DIAGNOSIS — I36.1 NONRHEUMATIC TRICUSPID (VALVE) INSUFFICIENCY: ICD-10-CM

## 2022-12-15 DIAGNOSIS — F84.0 AUTISTIC DISORDER: ICD-10-CM

## 2022-12-15 DIAGNOSIS — Z86.74 PERSONAL HISTORY OF SUDDEN CARDIAC ARREST: ICD-10-CM

## 2022-12-15 DIAGNOSIS — G93.6 CEREBRAL EDEMA: ICD-10-CM

## 2022-12-15 DIAGNOSIS — D72.828 OTHER ELEVATED WHITE BLOOD CELL COUNT: ICD-10-CM

## 2022-12-15 DIAGNOSIS — Z53.20 PROCEDURE AND TREATMENT NOT CARRIED OUT BECAUSE OF PATIENT'S DECISION FOR UNSPECIFIED REASONS: ICD-10-CM

## 2022-12-15 DIAGNOSIS — T38.0X5A ADVERSE EFFECT OF GLUCOCORTICOIDS AND SYNTHETIC ANALOGUES, INITIAL ENCOUNTER: ICD-10-CM

## 2022-12-15 DIAGNOSIS — J45.901 UNSPECIFIED ASTHMA WITH (ACUTE) EXACERBATION: ICD-10-CM

## 2022-12-15 DIAGNOSIS — A86 UNSPECIFIED VIRAL ENCEPHALITIS: ICD-10-CM

## 2023-01-30 NOTE — ED ADULT NURSE NOTE - NSIMPLEMENTINTERV_GEN_ALL_ED
Implemented All Fall Risk Interventions:  [employer] to call system. Call bell, personal items and telephone within reach. Instruct patient to call for assistance. Room bathroom lighting operational. Non-slip footwear when patient is off stretcher. Physically safe environment: no spills, clutter or unnecessary equipment. Stretcher in lowest position, wheels locked, appropriate side rails in place. Provide visual cue, wrist band, yellow gown, etc. Monitor gait and stability. Monitor for mental status changes and reorient to person, place, and time. Review medications for side effects contributing to fall risk. Reinforce activity limits and safety measures with patient and family. Plastic Surgeon (E): Dr Forbes

## 2023-03-22 RX ORDER — METFORMIN HYDROCHLORIDE 850 MG/1
1 TABLET ORAL
Qty: 0 | Refills: 0 | DISCHARGE

## 2023-03-22 RX ORDER — MONTELUKAST 4 MG/1
1 TABLET, CHEWABLE ORAL
Qty: 0 | Refills: 0 | DISCHARGE

## 2023-03-22 RX ORDER — ALBUTEROL 90 UG/1
2 AEROSOL, METERED ORAL
Qty: 0 | Refills: 0 | DISCHARGE

## 2023-03-22 RX ORDER — IPRATROPIUM/ALBUTEROL SULFATE 18-103MCG
3 AEROSOL WITH ADAPTER (GRAM) INHALATION
Qty: 0 | Refills: 0 | DISCHARGE

## 2023-05-08 NOTE — ED PROVIDER NOTE - PHYSICAL EXAMINATION
GEN: Alert & Oriented x 3, No acute distress. Calm, appropriate.  RESP: Lungs clear to auscult bilat. no wheezes, rhonchi or rales. No retractions. Equal air entry.  CARDIO: regular rate and rhythm, no murmurs, rubs or gallops. Normal S1, S2. Pedal pulses 2+ bilaterally.   MS: Full ROM of left foot and big toe against resistance. No obvious deformity or swelling to big toe. Tenderness with palpation at the base of the dorsum of the left 1st digit.   SKIN: no rashes/lesions, no erythema or warmth to left 1st digit. no ecchymosis.  NEURO: Sensation intact of left foot. normal range of motion/motor intact/TENDERNESS

## 2023-07-07 NOTE — ED ADULT NURSE NOTE - CHIEF COMPLAINT QUOTE
Silver Nitrate Text: The wound bed was treated with silver nitrate after the biopsy was performed. BIBA from home for asthma exacerbation   pt did not have any more nebulizer tx at home  EMS gave 3 duonebs en route  pt with b/l expiratory wheezes

## 2023-07-10 NOTE — ED ADULT NURSE NOTE - NSICDXPASTMEDICALHX_GEN_ALL_CORE_FT
Maria T Mcdonald MD Speck Malmberg, Amanda D, RN  Hi,   I just got this referral from my patient.  Can we help set her up?   while I'm on service would work- that's why I asked about the MA schedule that day.  We could do 3:00?   Another option would be the  at 2:00 after my other MA patient at 1:15 but I would prefer the .   Thanks!   Nga           Previous Messages       ----- Message -----   From: Emily Her APNP   Sent: 7/10/2023   7:46 AM CDT   To: Maria T Mcdonald MD   Subject: family referral                                   Hi Dr. Mcdonald,     My sister-in-law Lavern just told us she is pregnant. She's about 5 weeks along and hoping to establish with you.     Lavern Arden    1992   Phone # [phone]     Thank you!   Emily Her      PAST MEDICAL HISTORY:  Asthma

## 2023-09-22 ENCOUNTER — APPOINTMENT (OUTPATIENT)
Dept: NEUROLOGY | Facility: CLINIC | Age: 33
End: 2023-09-22

## 2023-11-07 NOTE — ED ADULT NURSE NOTE - DATE OF LAST VACCINATION
Clinic hours for Dr. Zeng:  Monday    In Surgery  Tuesday 7:30am - 4:30pm  Wednesday 7:30am - 4:30pm  Thursday       7:30am - 4:30pm  Friday  7:30 - 11am [facility]    If you need a refill on your prescription, please call your pharmacy and let them know. Please be proactive and call before your medication runs out. The pharmacy will then contact us for the refill. Please allow 24-48 hours for the refill to be processed.     If your Physician/Specialty Provider has ordered additional laboratory or radiology testing to be done before your next scheduled office visit, those results will be discussed with you at that upcoming visit. This will allow you the opportunity to go over the results in person with your provider. If your results require immediate intervention, you will be contacted sooner by phone call.     If your Physician/Specialty Provider has ordered labs for you to be done either today during this office visit, or in between office visits, you may receive any non-urgent test results and recommendations via your [employer]. To retrieve these results and recommendations, please click on your lab result itself to see if any comments have been left for you from your provider. If you do not have a [employer] account/[employer], your provider's office will either call you with those results or you may reach out to the office yourself to obtain results.    You may be receiving a patient satisfaction survey in the mail or in your email. If you receive an email survey, please look for the subject line of: \" Your provider name\" would like your feedback\". Please take the time to complete your survey either via the mail or email, as your feedback is very important to us. We strive to make your experience exceptional and your comments help us with that goal. We look forward to hearing from you.         11-Sep-2021

## 2023-12-15 NOTE — ED PROVIDER NOTE - SEVERE SEPSIS CRITERIA MET YN (MLM)
Sw received a consult to assist with counseling. Sw called Patient ([phone]). A voice message was left requesting she call back.    Carol Persaud LCSW    [phone]    
Sepsis Criteria were met:

## 2024-01-03 ENCOUNTER — EMERGENCY (EMERGENCY)
Facility: HOSPITAL | Age: 34
LOS: 0 days | Discharge: HOME | End: 2024-01-03
Attending: STUDENT IN AN ORGANIZED HEALTH CARE EDUCATION/TRAINING PROGRAM
Payer: MEDICAID

## 2024-01-03 VITALS
TEMPERATURE: 98 F | OXYGEN SATURATION: 96 % | RESPIRATION RATE: 20 BRPM | HEART RATE: 93 BPM | DIASTOLIC BLOOD PRESSURE: 84 MMHG | SYSTOLIC BLOOD PRESSURE: 120 MMHG

## 2024-01-03 VITALS
DIASTOLIC BLOOD PRESSURE: 67 MMHG | RESPIRATION RATE: 22 BRPM | SYSTOLIC BLOOD PRESSURE: 117 MMHG | TEMPERATURE: 98 F | WEIGHT: 240.97 LBS | HEART RATE: 104 BPM | OXYGEN SATURATION: 97 % | HEIGHT: 72 IN

## 2024-01-03 DIAGNOSIS — J45.901 UNSPECIFIED ASTHMA WITH (ACUTE) EXACERBATION: ICD-10-CM

## 2024-01-03 DIAGNOSIS — R06.2 WHEEZING: ICD-10-CM

## 2024-01-03 DIAGNOSIS — R06.00 DYSPNEA, UNSPECIFIED: ICD-10-CM

## 2024-01-03 PROBLEM — J45.909 UNSPECIFIED ASTHMA, UNCOMPLICATED: Chronic | Status: ACTIVE | Noted: 2022-11-30

## 2024-01-03 PROCEDURE — 99283 EMERGENCY DEPT VISIT LOW MDM: CPT | Mod: 25

## 2024-01-03 PROCEDURE — 99284 EMERGENCY DEPT VISIT MOD MDM: CPT

## 2024-01-03 PROCEDURE — 71045 X-RAY EXAM CHEST 1 VIEW: CPT

## 2024-01-03 PROCEDURE — 71045 X-RAY EXAM CHEST 1 VIEW: CPT | Mod: 26

## 2024-01-03 NOTE — ED PROVIDER NOTE - CHIEF COMPLAINT
The patient is a 33y Female complaining of difficulty breathing.
Spine appears normal, range of motion is not limited, no muscle or joint tenderness

## 2024-01-03 NOTE — ED PROVIDER NOTE - NSFOLLOWUPINSTRUCTIONS_ED_ALL_ED_FT
Please continue nebulizers and take prednisone 50mg once per day for 4 additional days. Return to the ED with any other new/concerning symptoms.         Asthma    Asthma is a recurring condition in which the airways tighten and narrow, making it difficult to breath. Asthma exacerbations, also called asthma attacks, range from minor to life-threatening. Symptoms include wheezing, coughing, chest tightness, or shortness of breath. The diagnosis of asthma is made by a review of your medical history and a physical exam, but may involve additional testing. Asthma cannot be cured, but medicines and lifestyle changes can help control it. Avoid triggers of asthma which may include animal dander, pollen, mold, smoke, air pollutants, etc.     SEEK IMMEDIATE MEDICAL CARE IF YOU HAVE THE FOLLOWING SYMPTOMS: worsening of symptoms, symptoms that do not respond to medication, shortness of breath at rest, chest pain, bluish discoloration to lips or fingertips, lightheadedness/dizziness, or fever.

## 2024-01-03 NOTE — ED ADULT NURSE NOTE - NSFALLUNIVINTERV_ED_ALL_ED
Bed/Stretcher in lowest position, wheels locked, appropriate side rails in place/Call bell, personal items and telephone in reach/Instruct patient to call for assistance before getting out of bed/chair/stretcher/Non-slip footwear applied when patient is off stretcher/[place] to call system/Physically safe environment - no spills, clutter or unnecessary equipment/Purposeful proactive rounding/Room/bathroom lighting operational, light cord in reach Bed/Stretcher in lowest position, wheels locked, appropriate side rails in place/Call bell, personal items and telephone in reach/Instruct patient to call for assistance before getting out of bed/chair/stretcher/Non-slip footwear applied when patient is off stretcher/[place] to call system/Physically safe environment - no spills, clutter or unnecessary equipment/Purposeful proactive rounding/Room/bathroom lighting operational, light cord in reach

## 2024-01-03 NOTE — ED ADULT TRIAGE NOTE - CHIEF COMPLAINT QUOTE
BIBA with complaints of difficulty breathing with slight cough tonight, asthma acting up. Combivent nebs x 3 and Decadron 12 mg IV given by EMS

## 2024-01-03 NOTE — ED PROVIDER NOTE - PHYSICAL EXAMINATION
Physical Exam    Constitutional: No acute distress.   Eyes: Conjunctiva pink, Sclera clear, PERRLA, EOMI.  ENT: No sinus tenderness. No nasal discharge. No oropharyngeal erythema, edema, or exudates. Uvula midline.   Cardiovascular: Regular rate, regular rhythm. No noted murmurs rubs or gallops.  Respiratory: unlabored respiratory effort, mild crackles right lower lobe. No wheezing   Gastrointestinal: Normal bowel sounds. soft, non distended, non-tender abdomen.   Musculoskeletal: supple neck, no midline tenderness. No joint or bony deformity.   Integumentary: warm, dry, no rash  Neurologic: awake, alert, cranial nerves II-XII grossly intact, extremities’ motor and sensory functions grossly intact

## 2024-01-03 NOTE — ED PROVIDER NOTE - PATIENT PORTAL LINK FT
You can access the FollowMyHealth Patient Portal offered by [facility] by registering at the following website: [email or web address]. By joining [employer]’s FollowMyHealth portal, you will also be able to view your health information using other applications (apps) compatible with our system. You can access the FollowMyHealth Patient Portal offered by [facility] by registering at the following website: [email or web address]. By joining [employer]’s FollowMyHealth portal, you will also be able to view your health information using other applications (apps) compatible with our system.

## 2024-01-03 NOTE — ED PROVIDER NOTE - OBJECTIVE STATEMENT
33 year old female with a history of asthma presents to the ED with difficulty breathing. Patient reports shortness of breath, wheezing and dry cough progressive x 2 weeks. Today she used home nebulizers which did not help. Was given more nebulizers and decadron by EMS and is now feeling better. Denies fever, chills, chest pain, abdominal pain, nausea, vomiting, diarrhea, constipation, dysuria, hematuria, lower extremity swelling, rash.

## 2024-01-03 NOTE — ED ADULT NURSE NOTE - DISCHARGE DATE/TIME
BRIEF PROCEDURE NOTE    Date of Procedure: 5/3/2018   Preoperative Diagnosis: Dyspnea  Postoperative Diagnosis: Non obstructive CAD    Procedure: Left heart cath, LV angiography, coronary angiography  Interventional Cardiologist: Darion Anthony MD  Anesthesia: local + IV sedation  Estimated Blood Loss: Minimal  Findings:     Tried Brachial vein access - unsuccessful     R radial acess - easy access  Difficulty gettting into asc aorta   R SCV angiogram - tortuous    R FV access - micropuncture  R CFA access - micropuncture      L Main: Long ; Large - Nml    LAD: Med - prox ; Small distal ; Mid 20%; D1 - Small    LCflex: OM1 - Med - Prox 30%; Mid- Small to med; 60%; Prob component of spasm    RCA: Small ; Prox catheter - 10%; PDA - very small    LVEDP: 14 mm Hg    LVEF: No assessed    No significant gradient across aortic valve. RHC findings:    RAPm=   7    mmHg  RVSP= 24     mmHg  PAPm=  19      mmHg  PCWPm=  10  mmHg  CO= 4.33         l/min  CI= 2.16 l/min/m2    Specimens Removed : None    Closure Device:  Manual        See full cath note.     Complications: none    Darion Anthony MD 03-Jan-2024 04:56

## 2024-01-03 NOTE — ED PROVIDER NOTE - CLINICAL SUMMARY MEDICAL DECISION MAKING FREE TEXT BOX
Abdomen , soft, nontender, nondistended , no guarding or rigidity , no masses palpable , normal bowel sounds Liver and Spleen , no hepatosplenomegaly Rectal deferred
33-year-old female, past medical history of asthma never been intubated, presenting for cough for 2 weeks, associated with increasing shortness of breath that feels like her asthma exacerbation.  Patient was given 3 DuoNebs and 12 mg of IV Decadron and patient now feels improved.  Well-appearing on exam.  Minimal crackles left lower base but normal respiratory effort.  No wheezing.  Heart rate and rhythm regular.  Imaging ordered and reviewed.  Discussed results with patient.  Given return precautions and follow-up outpatient.  Patient states that she is already reached out to her PMD for follow-up.  Medication sent to pharmacy.  Patient states that she still has albuterol at home.  Patient comfortable with outpatient follow-up.

## 2024-03-19 NOTE — ED PROCEDURE NOTE - CPROC ED COMPLICATIONS1
no Detail Level: Detailed Depth Of Biopsy: dermis Was A Bandage Applied: Yes Size Of Lesion In Cm: 1.4 X Size Of Lesion In Cm: 0 Biopsy Type: H and E Biopsy Method: Dermablade Anesthesia Type: 1% lidocaine with epinephrine Anesthesia Volume In Cc: 0.3 Hemostasis: Edgar's Wound Care: Mupirocin Dressing: Band-Aid Destruction After The Procedure: No Type Of Destruction Used: Curettage Cryotherapy Text: The wound bed was treated with cryotherapy after the biopsy was performed. Electrodesiccation Text: The wound bed was treated with electrodesiccation after the biopsy was performed. Electrodesiccation And Curettage Text: The wound bed was treated with electrodesiccation and curettage after the biopsy was performed. Silver Nitrate Text: The wound bed was treated with silver nitrate after the biopsy was performed. Lab: [id number] Lab Facility: [id number] Consent: The provider's intent is to obtain a tissue sample solely for diagnostic purposes.  Written consent to obtain tissue sample was obtained and risks were reviewed including but not limited to scarring, infection, bleeding, scabbing, incomplete removal, nerve damage and allergy to anesthesia. Post-Care Instructions: I reviewed with the patient in detail post-care instructions. Patient is to keep the biopsy site dry overnight, and then apply vaseline daily until healed. Notification Instructions: Patient will be notified of biopsy results. However, patient instructed to call the office if not contacted within 2 weeks. Billing Type: Third-Party Bill Information: Selecting Yes will display possible errors in your note based on the variables you have selected. This validation is only offered as a suggestion for you. PLEASE NOTE THAT THE VALIDATION TEXT WILL BE REMOVED WHEN YOU FINALIZE YOUR NOTE. IF YOU WANT TO FAX A PRELIMINARY NOTE YOU WILL NEED TO TOGGLE THIS TO 'NO' IF YOU DO NOT WANT IT IN YOUR FAXED NOTE. Size Of Lesion In Cm: 0.5

## 2024-04-18 ENCOUNTER — EMERGENCY (EMERGENCY)
Facility: HOSPITAL | Age: 34
LOS: 0 days | Discharge: HOME | End: 2024-04-18
Attending: EMERGENCY MEDICINE
Payer: MEDICAID

## 2024-04-18 VITALS
RESPIRATION RATE: 18 BRPM | HEIGHT: 66 IN | DIASTOLIC BLOOD PRESSURE: 58 MMHG | TEMPERATURE: 98 F | WEIGHT: 255.07 LBS | OXYGEN SATURATION: 96 % | HEART RATE: 114 BPM | SYSTOLIC BLOOD PRESSURE: 110 MMHG

## 2024-04-18 DIAGNOSIS — J45.901 UNSPECIFIED ASTHMA WITH (ACUTE) EXACERBATION: ICD-10-CM

## 2024-04-18 DIAGNOSIS — R00.0 TACHYCARDIA, UNSPECIFIED: ICD-10-CM

## 2024-04-18 PROCEDURE — 94640 AIRWAY INHALATION TREATMENT: CPT

## 2024-04-18 PROCEDURE — 99284 EMERGENCY DEPT VISIT MOD MDM: CPT

## 2024-04-18 PROCEDURE — 99283 EMERGENCY DEPT VISIT LOW MDM: CPT | Mod: 25

## 2024-04-18 RX ORDER — DEXAMETHASONE 0.5 MG/5ML
10 ELIXIR ORAL ONCE
Refills: 0 | Status: COMPLETED | OUTPATIENT
Start: 2024-04-18 | End: 2024-04-18

## 2024-04-18 RX ORDER — IPRATROPIUM/ALBUTEROL SULFATE 18-103MCG
3 AEROSOL WITH ADAPTER (GRAM) INHALATION ONCE
Refills: 0 | Status: COMPLETED | OUTPATIENT
Start: 2024-04-18 | End: 2024-04-18

## 2024-04-18 RX ADMIN — Medication 10 MILLIGRAM(S): at 08:15

## 2024-04-18 RX ADMIN — Medication 3 MILLILITER(S): at 08:16

## 2024-04-18 NOTE — ED PROVIDER NOTE - PATIENT PORTAL LINK FT
You can access the FollowMyHealth Patient Portal offered by [facility] by registering at the following website: [email or web address]. By joining [employer]’s FollowMyHealth portal, you will also be able to view your health information using other applications (apps) compatible with our system.

## 2024-04-18 NOTE — ED PROVIDER NOTE - OBJECTIVE STATEMENT
33-year-old female past medical history of asthma coming here for acute asthma exacerbation.  Recent sick contact of daughter.  Symptoms started last night/early this morning.  Took her regular meds with mild relief.  EMS gave her 1 treatment of DuoNeb's with moderate relief.  Patient complaining of improved symptoms by time she arrived in the ED.  No other complaints.  No fevers no chills no chest pain.  No shortness of breath.

## 2024-04-18 NOTE — ED PROVIDER NOTE - PHYSICAL EXAMINATION
CONSTITUTIONAL:  in no acute distress.   SKIN: warm, dry  HEAD: Normocephalic; atraumatic.  EYES: PERRL, EOMI, normal sclera and conjunctiva   ENT: No nasal discharge; airway clear.  NECK: Supple; non tender.  CARD:  Regular rate and rhythm.   RESP: NO inc WOB Mild expiratory wheezing diffusely  ABD: soft ntnd  EXT: Normal ROM.    LYMPH: No acute cervical adenopathy.  NEURO: Alert, oriented, grossly unremarkable  PSYCH: Cooperative, appropriate.

## 2024-04-18 NOTE — ED PROVIDER NOTE - NSFOLLOWUPINSTRUCTIONS_ED_ALL_ED_FT
Our Emergency Department Referral Coordinators will be reaching out to you in the next 24-48 hours from 9:00am to 5:00pm to schedule a follow up appointment. Please expect a phone call from the hospital in that time frame. If you do not receive a call or if you have any questions or concerns, you can reach them at   [phone] [place] [address].      Asthma    Asthma is a recurring condition in which the airways tighten and narrow, making it difficult to breath. Asthma exacerbations, also called asthma attacks, range from minor to life-threatening. Symptoms include wheezing, coughing, chest tightness, or shortness of breath. The diagnosis of asthma is made by a review of your medical history and a physical exam, but may involve additional testing. Asthma cannot be cured, but medicines and lifestyle changes can help control it. Avoid triggers of asthma which may include animal dander, pollen, mold, smoke, air pollutants, etc.     SEEK IMMEDIATE MEDICAL CARE IF YOU HAVE THE FOLLOWING SYMPTOMS: worsening of symptoms, symptoms that do not respond to medication, shortness of breath at rest, chest pain, bluish discoloration to lips or fingertips, lightheadedness/dizziness, or fever.

## 2024-04-18 NOTE — ED ADULT TRIAGE NOTE - CHIEF COMPLAINT QUOTE
Pt BIBA from home c/o SOB since this morning. hx of asthma. as per EMS neb treatment x1, symptoms resolved.

## 2024-04-18 NOTE — ED PROVIDER NOTE - ATTENDING CONTRIBUTION TO CARE
33-year-old female history of asthma here valuation of shortness of breath.  Patient reports this morning she was wheezing coughing felt short of breath.  Was given 1 neb by EMS and is feeling improved.  She has no chest pain hemoptysis leg swelling fever chills.  On exam patient no distress S1-S2 mild tachycardia mild bilateral wheezing soft nontender  Impression  patient with a history of asthma presents with cough wheezing.  patient felt better after neb given decadron the ed stable for discharge.

## 2024-04-18 NOTE — ED PROVIDER NOTE - CLINICAL SUMMARY MEDICAL DECISION MAKING FREE TEXT BOX
patient with a history of asthma presents with cough wheezing.  patient felt better after neb given decadron the ed stable for discharge.

## 2024-05-31 ENCOUNTER — NON-APPOINTMENT (OUTPATIENT)
Age: 34
End: 2024-05-31

## 2024-05-31 ENCOUNTER — APPOINTMENT (OUTPATIENT)
Dept: PULMONOLOGY | Facility: CLINIC | Age: 34
End: 2024-05-31
Payer: MEDICAID

## 2024-05-31 VITALS
HEIGHT: 67 IN | RESPIRATION RATE: 14 BRPM | DIASTOLIC BLOOD PRESSURE: 70 MMHG | WEIGHT: 249 LBS | BODY MASS INDEX: 39.08 KG/M2 | OXYGEN SATURATION: 96 % | SYSTOLIC BLOOD PRESSURE: 102 MMHG | HEART RATE: 110 BPM

## 2024-05-31 DIAGNOSIS — J45.30 MILD PERSISTENT ASTHMA, UNCOMPLICATED: ICD-10-CM

## 2024-05-31 PROCEDURE — 99214 OFFICE O/P EST MOD 30 MIN: CPT | Mod: 25

## 2024-05-31 PROCEDURE — 94010 BREATHING CAPACITY TEST: CPT

## 2024-05-31 RX ORDER — FLUTICASONE FUROATE, UMECLIDINIUM BROMIDE AND VILANTEROL TRIFENATATE 200; 62.5; 25 UG/1; UG/1; UG/1
200-62.5-25 POWDER RESPIRATORY (INHALATION)
Qty: 1 | Refills: 5 | Status: ACTIVE | COMMUNITY
Start: 2024-05-31 | End: 1900-01-01

## 2024-05-31 NOTE — HISTORY OF PRESENT ILLNESS
[Doing Well] : doing well [Well Controlled] : Well controlled [Checks Regularly] : The patient checks ~his/her~ peak flow regularly [Good Control] : peak flow has been good [None] : None [Goals--Doing Well] : the patient is doing well with ~his/her~ asthma goals [Moderate Persistent] : moderate persistent [Wheezing] : stable wheezing [Cough] : stable coughing [Adherent] : the patient is adherent with ~his/her~ medication regimen [PFTs] : pulmonary function tests

## 2024-05-31 NOTE — ASSESSMENT
[FreeTextEntry1] : Moderate Persistent asthma not completely compensated secondary to lack of controller inhaler  Allergic type with increased IgE

## 2024-10-18 ENCOUNTER — EMERGENCY (EMERGENCY)
Facility: HOSPITAL | Age: 34
LOS: 0 days | Discharge: HOME | End: 2024-10-18
Attending: EMERGENCY MEDICINE
Payer: MEDICAID

## 2024-10-18 VITALS
WEIGHT: 238.1 LBS | DIASTOLIC BLOOD PRESSURE: 61 MMHG | HEIGHT: 66 IN | HEART RATE: 118 BPM | SYSTOLIC BLOOD PRESSURE: 108 MMHG | TEMPERATURE: 98 F | OXYGEN SATURATION: 95 % | RESPIRATION RATE: 18 BRPM

## 2024-10-18 VITALS
RESPIRATION RATE: 18 BRPM | SYSTOLIC BLOOD PRESSURE: 97 MMHG | OXYGEN SATURATION: 96 % | HEART RATE: 104 BPM | DIASTOLIC BLOOD PRESSURE: 69 MMHG

## 2024-10-18 DIAGNOSIS — R06.02 SHORTNESS OF BREATH: ICD-10-CM

## 2024-10-18 DIAGNOSIS — J45.901 UNSPECIFIED ASTHMA WITH (ACUTE) EXACERBATION: ICD-10-CM

## 2024-10-18 PROCEDURE — 99283 EMERGENCY DEPT VISIT LOW MDM: CPT | Mod: 25

## 2024-10-18 PROCEDURE — 94640 AIRWAY INHALATION TREATMENT: CPT

## 2024-10-18 PROCEDURE — 99284 EMERGENCY DEPT VISIT MOD MDM: CPT

## 2024-10-18 RX ORDER — IPRATROPIUM BROMIDE AND ALBUTEROL SULFATE .5; 3 MG/3ML; MG/3ML
3 SOLUTION RESPIRATORY (INHALATION) ONCE
Refills: 0 | Status: COMPLETED | OUTPATIENT
Start: 2024-10-18 | End: 2024-10-18

## 2024-10-18 RX ADMIN — IPRATROPIUM BROMIDE AND ALBUTEROL SULFATE 3 MILLILITER(S): .5; 3 SOLUTION RESPIRATORY (INHALATION) at 07:58

## 2024-10-18 RX ADMIN — IPRATROPIUM BROMIDE AND ALBUTEROL SULFATE 3 MILLILITER(S): .5; 3 SOLUTION RESPIRATORY (INHALATION) at 08:00

## 2024-10-18 RX ADMIN — IPRATROPIUM BROMIDE AND ALBUTEROL SULFATE 3 MILLILITER(S): .5; 3 SOLUTION RESPIRATORY (INHALATION) at 07:57

## 2024-10-18 RX ADMIN — Medication 10 MILLIGRAM(S): at 08:00

## 2024-10-18 NOTE — ED PROVIDER NOTE - PHYSICAL EXAMINATION
VITAL SIGNS: I have reviewed nursing notes and confirm.  CONSTITUTIONAL: 35 yo F sitting on chair; in no acute distress.  SKIN: Skin exam is warm and dry, no acute rash.  HEAD: Normocephalic; atraumatic.  EYES: Conjunctiva and sclera clear.  ENT: No nasal discharge; airway clear.   CARD: S1, S2 normal; no murmurs, gallops, or rubs. Regular rate and rhythm.  RESP: No wheezes, rales or rhonchi. Speaking in full sentences.   ABD: Normal bowel sounds; soft; non-distended; non-tender; No rebound or guarding. No CVA tenderness.  EXT: Normal ROM. No clubbing, cyanosis or edema. No calf TTP or swelling.   NEURO: Alert, oriented. Grossly unremarkable. No focal deficits.

## 2024-10-18 NOTE — ED PROVIDER NOTE - CLINICAL SUMMARY MEDICAL DECISION MAKING FREE TEXT BOX
34-year-old female presents emergency department for asthma exacerbation.  Patient given steroids breathing treatments with improvement discharged home.

## 2024-10-18 NOTE — ED PROVIDER NOTE - OBJECTIVE STATEMENT
33 yo F with PMHx of asthma presents to the ED c/o SOB that started this morning. Pt states symptoms are typical of her asthma exacerbation. Pt tried using her inhaler without relief so she called EMS. Pt received nebulizer en route and felt mild improvement in symptoms. She denies other complaints. Pt denies fever, chills, nausea, vomiting, abdominal pain, diarrhea, headache, dizziness, weakness, chest pain, back pain, LOC, trauma, urinary symptoms, cough, calf pain/swelling, recent travel, recent surgery.

## 2024-10-18 NOTE — ED ADULT TRIAGE NOTE - CHIEF COMPLAINT QUOTE
BIBA from home, c/o asthma exacerbation. Use inhaler and nebulizer at home with minimal relief. Received 1 combi by EMS. Hx of 1 intubation

## 2024-10-18 NOTE — ED PROVIDER NOTE - ATTENDING APP SHARED VISIT CONTRIBUTION OF CARE
34-year-old female past medical history of asthma presents emergency department for shortness of breath started this morning while getting her daughter ready for school.  No cough no fever no chills no nausea no vomiting.  Patient states she takes albuterol and Singulair for her asthma.    Const: No apparent distress  Eyes: PERRL, no conjunctival injection  HENT:  Neck supple without meningismus   CV: RRR, Warm, well-perfused extremities  RESP: CTA B/L, no tachypnea   GI: soft, non-tender, non-distended  MSK: No gross deformities appreciated  Skin: Warm, dry. No rashes  Neuro: Alert,

## 2024-10-18 NOTE — ED PROVIDER NOTE - PATIENT PORTAL LINK FT
You can access the FollowMyHealth Patient Portal offered by [facility] by registering at the following website: [email or web address]. By joining [employer]’s FollowMyHealth portal, you will also be able to view your health information using other applications (apps) compatible with our system.

## 2024-11-30 ENCOUNTER — EMERGENCY (EMERGENCY)
Facility: HOSPITAL | Age: 34
LOS: 0 days | Discharge: HOME | End: 2024-11-30
Attending: EMERGENCY MEDICINE
Payer: MEDICAID

## 2024-11-30 VITALS
WEIGHT: 240.08 LBS | HEART RATE: 98 BPM | TEMPERATURE: 98 F | HEIGHT: 66 IN | OXYGEN SATURATION: 96 % | DIASTOLIC BLOOD PRESSURE: 58 MMHG | SYSTOLIC BLOOD PRESSURE: 101 MMHG | RESPIRATION RATE: 20 BRPM

## 2024-11-30 DIAGNOSIS — R05.1 ACUTE COUGH: ICD-10-CM

## 2024-11-30 DIAGNOSIS — J45.901 UNSPECIFIED ASTHMA WITH (ACUTE) EXACERBATION: ICD-10-CM

## 2024-11-30 DIAGNOSIS — R06.02 SHORTNESS OF BREATH: ICD-10-CM

## 2024-11-30 PROCEDURE — 99284 EMERGENCY DEPT VISIT MOD MDM: CPT

## 2024-11-30 PROCEDURE — 93010 ELECTROCARDIOGRAM REPORT: CPT

## 2024-11-30 PROCEDURE — 99283 EMERGENCY DEPT VISIT LOW MDM: CPT | Mod: 25

## 2024-11-30 PROCEDURE — 93005 ELECTROCARDIOGRAM TRACING: CPT

## 2024-11-30 RX ORDER — DEXAMETHASONE 0.5 MG/1
10 TABLET ORAL ONCE
Refills: 0 | Status: COMPLETED | OUTPATIENT
Start: 2024-11-30 | End: 2024-11-30

## 2024-11-30 RX ORDER — ALBUTEROL SULFATE 2.5 MG/3ML
2 VIAL, NEBULIZER (ML) INHALATION
Qty: 18 | Refills: 0
Start: 2024-11-30 | End: 2024-12-06

## 2024-11-30 RX ADMIN — DEXAMETHASONE 10 MILLIGRAM(S): 0.5 TABLET ORAL at 11:38

## 2024-12-10 ENCOUNTER — APPOINTMENT (OUTPATIENT)
Dept: PULMONOLOGY | Facility: CLINIC | Age: 34
End: 2024-12-10

## 2025-04-19 ENCOUNTER — EMERGENCY (EMERGENCY)
Facility: HOSPITAL | Age: 35
LOS: 0 days | Discharge: HOME | End: 2025-04-19
Attending: EMERGENCY MEDICINE
Payer: MEDICAID

## 2025-04-19 VITALS
RESPIRATION RATE: 18 BRPM | WEIGHT: 240.08 LBS | TEMPERATURE: 98 F | DIASTOLIC BLOOD PRESSURE: 77 MMHG | SYSTOLIC BLOOD PRESSURE: 124 MMHG | HEART RATE: 84 BPM | OXYGEN SATURATION: 99 %

## 2025-04-19 DIAGNOSIS — M79.622 PAIN IN LEFT UPPER ARM: ICD-10-CM

## 2025-04-19 DIAGNOSIS — M25.512 PAIN IN LEFT SHOULDER: ICD-10-CM

## 2025-04-19 DIAGNOSIS — E11.9 TYPE 2 DIABETES MELLITUS WITHOUT COMPLICATIONS: ICD-10-CM

## 2025-04-19 DIAGNOSIS — J45.909 UNSPECIFIED ASTHMA, UNCOMPLICATED: ICD-10-CM

## 2025-04-19 PROCEDURE — 73030 X-RAY EXAM OF SHOULDER: CPT | Mod: LT

## 2025-04-19 PROCEDURE — 73030 X-RAY EXAM OF SHOULDER: CPT | Mod: 26,LT

## 2025-04-19 PROCEDURE — 99284 EMERGENCY DEPT VISIT MOD MDM: CPT

## 2025-04-19 PROCEDURE — 96372 THER/PROPH/DIAG INJ SC/IM: CPT

## 2025-04-19 PROCEDURE — 73060 X-RAY EXAM OF HUMERUS: CPT | Mod: 26,LT

## 2025-04-19 PROCEDURE — 99284 EMERGENCY DEPT VISIT MOD MDM: CPT | Mod: 25

## 2025-04-19 PROCEDURE — 73060 X-RAY EXAM OF HUMERUS: CPT | Mod: LT

## 2025-04-19 RX ORDER — KETOROLAC TROMETHAMINE 30 MG/ML
15 INJECTION, SOLUTION INTRAMUSCULAR; INTRAVENOUS ONCE
Refills: 0 | Status: DISCONTINUED | OUTPATIENT
Start: 2025-04-19 | End: 2025-04-19

## 2025-04-19 RX ORDER — ACETAMINOPHEN 500 MG/5ML
975 LIQUID (ML) ORAL ONCE
Refills: 0 | Status: COMPLETED | OUTPATIENT
Start: 2025-04-19 | End: 2025-04-19

## 2025-04-19 RX ADMIN — Medication 975 MILLIGRAM(S): at 21:30

## 2025-04-19 RX ADMIN — KETOROLAC TROMETHAMINE 15 MILLIGRAM(S): 30 INJECTION, SOLUTION INTRAMUSCULAR; INTRAVENOUS at 21:00

## 2025-04-19 RX ADMIN — KETOROLAC TROMETHAMINE 15 MILLIGRAM(S): 30 INJECTION, SOLUTION INTRAMUSCULAR; INTRAVENOUS at 21:30

## 2025-04-19 RX ADMIN — Medication 975 MILLIGRAM(S): at 21:00

## 2025-04-23 ENCOUNTER — APPOINTMENT (OUTPATIENT)
Dept: ORTHOPEDIC SURGERY | Facility: CLINIC | Age: 35
End: 2025-04-23
Payer: MEDICAID

## 2025-04-23 DIAGNOSIS — S49.92XA UNSPECIFIED INJURY OF LEFT SHOULDER AND UPPER ARM, INITIAL ENCOUNTER: ICD-10-CM

## 2025-04-23 PROCEDURE — 99203 OFFICE O/P NEW LOW 30 MIN: CPT

## 2025-04-23 RX ORDER — DICLOFENAC SODIUM 50 MG/1
50 TABLET, DELAYED RELEASE ORAL
Qty: 60 | Refills: 0 | Status: ACTIVE | COMMUNITY
Start: 2025-04-23 | End: 1900-01-01

## 2025-05-06 ENCOUNTER — EMERGENCY (EMERGENCY)
Facility: HOSPITAL | Age: 35
LOS: 0 days | Discharge: HOME | End: 2025-05-06
Attending: EMERGENCY MEDICINE
Payer: MEDICAID

## 2025-05-06 VITALS
SYSTOLIC BLOOD PRESSURE: 100 MMHG | TEMPERATURE: 98 F | RESPIRATION RATE: 16 BRPM | OXYGEN SATURATION: 98 % | HEART RATE: 81 BPM | DIASTOLIC BLOOD PRESSURE: 73 MMHG

## 2025-05-06 DIAGNOSIS — M25.522 PAIN IN LEFT ELBOW: ICD-10-CM

## 2025-05-06 DIAGNOSIS — R73.03 PREDIABETES: ICD-10-CM

## 2025-05-06 DIAGNOSIS — M25.512 PAIN IN LEFT SHOULDER: ICD-10-CM

## 2025-05-06 DIAGNOSIS — M79.602 PAIN IN LEFT ARM: ICD-10-CM

## 2025-05-06 DIAGNOSIS — J45.909 UNSPECIFIED ASTHMA, UNCOMPLICATED: ICD-10-CM

## 2025-05-06 PROCEDURE — 99283 EMERGENCY DEPT VISIT LOW MDM: CPT | Mod: 25

## 2025-05-06 PROCEDURE — 99283 EMERGENCY DEPT VISIT LOW MDM: CPT

## 2025-05-06 PROCEDURE — 96372 THER/PROPH/DIAG INJ SC/IM: CPT

## 2025-05-06 RX ORDER — LIDOCAINE HYDROCHLORIDE 20 MG/ML
1 JELLY TOPICAL
Qty: 2 | Refills: 0
Start: 2025-05-06

## 2025-05-06 RX ORDER — METHOCARBAMOL 500 MG/1
2 TABLET, FILM COATED ORAL
Qty: 18 | Refills: 0
Start: 2025-05-06 | End: 2025-05-08

## 2025-05-06 RX ORDER — KETOROLAC TROMETHAMINE 30 MG/ML
30 INJECTION, SOLUTION INTRAMUSCULAR; INTRAVENOUS ONCE
Refills: 0 | Status: DISCONTINUED | OUTPATIENT
Start: 2025-05-06 | End: 2025-05-06

## 2025-05-06 RX ORDER — LIDOCAINE HYDROCHLORIDE 20 MG/ML
1 JELLY TOPICAL ONCE
Refills: 0 | Status: COMPLETED | OUTPATIENT
Start: 2025-05-06 | End: 2025-05-06

## 2025-05-06 RX ADMIN — LIDOCAINE HYDROCHLORIDE 1 PATCH: 20 JELLY TOPICAL at 08:19

## 2025-05-06 RX ADMIN — KETOROLAC TROMETHAMINE 30 MILLIGRAM(S): 30 INJECTION, SOLUTION INTRAMUSCULAR; INTRAVENOUS at 08:18

## 2025-05-06 NOTE — ED PROVIDER NOTE - DIFFERENTIAL DIAGNOSIS
The differential diagnosis for patients clinical presentation includes but is not limited to:  msk pain  fracture  dislocation  ligamentous injury  rotator cuff injury Differential Diagnosis

## 2025-05-06 NOTE — ED PROVIDER NOTE - PATIENT PORTAL LINK FT
You can access the FollowMyHealth Patient Portal offered by [facility] by registering at the following website: [email or web address]. By joining [employer]’s FollowMyHealth portal, you will also be able to view your health information using other applications (apps) compatible with our system.

## 2025-05-06 NOTE — ED PROVIDER NOTE - OBJECTIVE STATEMENT
34-year-old female with past medical history of asthma, prediabetes, who presents for 2 weeks of left shoulder and elbow pain.  Patient was seen here on April 19 for the same complaint although returns today for persistent symptoms.  Notably worsening this morning.  Was evaluated at Ortho between ED visits for scheduled orthopedic follow-up in the next upcoming weeks.  Patient denies any trauma, numbness, tingling, weakness, swelling, redness, fevers, shortness of breath, chest pain, lightheadedness, neck pain.

## 2025-05-06 NOTE — ED ADULT NURSE NOTE - NSFALLUNIVINTERV_ED_ALL_ED
Bed/Stretcher in lowest position, wheels locked, appropriate side rails in place/Call bell, personal items and telephone in reach/Instruct patient to call for assistance before getting out of bed/chair/stretcher/Non-slip footwear applied when patient is off stretcher/[place] to call system/Physically safe environment - no spills, clutter or unnecessary equipment/Purposeful proactive rounding/Room/bathroom lighting operational, light cord in reach

## 2025-05-06 NOTE — ED PROVIDER NOTE - ATTENDING CONTRIBUTION TO CARE
34-year-old female with past med history of asthma left shoulder pain that started on 4/18, was seen in the ED on April 19, had x-rays done, that were negative, was given orthopedic referral, followed up with orthopedics on April 23 was prescribed diclofenac 50 mg as needed and recommended physical therapy which patient is following up with.  Patient reports pain is persisted, sharp, constant, moderate intensity, worse with movement or palpation, better at rest.  Did not take anything for the pain today.  Denies any trauma.  no head trauma or loc, not on any blood thinners. denies fever, chills, n/v, cp, sob, pleuritic chest pain, chest wall pain, rib pain, neck pain/stiffness, back pain, abd pain, weakness, numbness/tingling, decreased sensation, hearing a click/feeling a pop, lacerations, abrasions, ecchymoses, or swelling.     on exam: non toxic appearing pt sitting on stretcher in nad, no rash, no lacerations, abrasions, ecchymoses or swelling, PERRL, EOM intact, no periorbital swelling/ecchymoses, mmm, neck supple, no spinous ttp to neck or back, FROM, no palpable shelves or step offs, regular rate, radial pulses 2/4 b/l, cap refIll< 2 seconds, no pain to palpation to chest wall, ctabl w/ breath sounds present b/l, no wheezing or crackles, no accessory muscle use, no tachypnea, no stridor, no pain to palpation to ribs b/l, no crepitus, no subq emphysema, bs present throughout all 4 quadrants, abd soft, nd, nt, no rebound tenderness or guarding, no cvat, Pain to palpation to L anterior shoulder and humerus , able to fully range but with pain  (-) crepitus, no obvious bony deformities, , no pain to palpation to clavicles, elbows, wrists, with no snuff box tenderness, AAOx3. Motor 5/5 and sensation intact throughout upper and lower ext. CN II-XII intact. No facial droop or slurring of speech. ambulating with no ataxia or difficulty. GCS 15.    Plan: Extensive conversation had with patient, discussed follow-up with PMD, orthopedics told her to follow-up in 4 weeks which patient states she will follow-up with and will continue physical therapy, discussed the possible need for an MRI which she will speak to the PMD and orthopedics about.  Patient comfortable with getting pain medications here, understands signs and symptoms to return for, reports will follow-up.

## 2025-05-06 NOTE — ED PROVIDER NOTE - PHYSICAL EXAMINATION
Initial vital signs reviewed.  General: NAD, nontoxic appearing.  HENT: AT/NC.  Eyes: non-injected conjunctivae b/l.  Neck: supple.  CV: RRR, no murmurs. 2+ distal pulses x4.  Pulm: nonlabored work of breathing, CTAB.  Abd: soft, nondistended, nontender.  MSK: ttp left lateral elbow. no overlying edema, erythema, crepitus. compartments soft. 2+ radial pulses b/l. nv intact distally. full arom although with discomfort.  Skin: warm, dry, well-perfused.  Neuro: A&Ox4.

## 2025-05-06 NOTE — ED PROVIDER NOTE - CLINICAL SUMMARY MEDICAL DECISION MAKING FREE TEXT BOX
34-year-old female with past med history of asthma left shoulder pain that started on 4/18, was seen in the ED on April 19, had x-rays done, that were negative, was given orthopedic referral, followed up with orthopedics on April 23 was prescribed diclofenac 50 mg as needed and recommended physical therapy which patient is following up with.  Patient reports pain is persisted, sharp, constant, moderate intensity, worse with movement or palpation, better at rest.  Did not take anything for the pain today.  Denies any trauma.  no head trauma or loc, not on any blood thinners. denies fever, chills, n/v, cp, sob, pleuritic chest pain, chest wall pain, rib pain, neck pain/stiffness, back pain, abd pain, weakness, numbness/tingling, decreased sensation, hearing a click/feeling a pop, lacerations, abrasions, ecchymoses, or swelling.     on exam: non toxic appearing pt sitting on stretcher in nad, no rash, no lacerations, abrasions, ecchymoses or swelling, PERRL, EOM intact, no periorbital swelling/ecchymoses, mmm, neck supple, no spinous ttp to neck or back, FROM, no palpable shelves or step offs, regular rate, radial pulses 2/4 b/l, cap refIll< 2 seconds, no pain to palpation to chest wall, ctabl w/ breath sounds present b/l, no wheezing or crackles, no accessory muscle use, no tachypnea, no stridor, no pain to palpation to ribs b/l, no crepitus, no subq emphysema, bs present throughout all 4 quadrants, abd soft, nd, nt, no rebound tenderness or guarding, no cvat, Pain to palpation to L anterior shoulder and humerus , able to fully range but with pain  (-) crepitus, no obvious bony deformities, , no pain to palpation to clavicles, elbows, wrists, with no snuff box tenderness, AAOx3. Motor 5/5 and sensation intact throughout upper and lower ext. CN II-XII intact. No facial droop or slurring of speech. ambulating with no ataxia or difficulty. GCS 15.    Plan: Extensive conversation had with patient, discussed follow-up with PMD, orthopedics told her to follow-up in 4 weeks which patient states she will follow-up with and will continue physical therapy, discussed the possible need for an MRI which she will speak to the PMD and orthopedics about.  Patient comfortable with getting pain medications here, understands signs and symptoms to return for, reports will follow-up.    Appropriate medications for patient's presenting complaints were ordered and effects were reassessed.  Patient's records (prior ED visits and orhto outpatient notes) were reviewed.  Escalation to admission/observation was considered. However patient feels much better and is comfortable with discharge.  Appropriate follow-up was arranged.  Supportive care and home care discussed in detail. Patient aware they may have to return for re-evaluation and possible admission if outpatient treatment fails. Strict return precautions discussed.

## 2025-05-06 NOTE — ED PROVIDER NOTE - PROGRESS NOTE DETAILS
ED Attending PARTHA Zimmerman  Supportive care and home care discussed in detail. Patient aware they may have to return for re-evaluation and possible admission if outpatient treatment fails. Strict return precautions discussed.  I discussed with patient need for possible MRI for further work up for their symptoms and how this was not possible in the Emergency Department at this time.  Follow up being provided to have further evaluation for this test given.

## 2025-05-06 NOTE — ED PROVIDER NOTE - NSFOLLOWUPINSTRUCTIONS_ED_ALL_ED_FT
PLEASE FOLLOW UP WITH PMD, ORTHOPEDICS AND PHYSICAL THERAPY AT YOUR APPOINTMENTS.     Arm Pain    WHAT YOU NEED TO KNOW:    Your arm pain may be caused by a number of conditions. Examples include arthritis, nerve problems, or an awkward position while you sleep. X-rays did not show a broken bone in your arm or wrist. Arm pain may be a sign of a serious condition that needs immediate care, such as a heart attack.    DISCHARGE INSTRUCTIONS:    Call 911 for any of the following: You have any of the following signs of a heart attack:     Squeezing, pressure, or pain in your chest that lasts longer than 5 minutes or returns      Discomfort or pain in your back, neck, jaw, stomach, or arm       Trouble breathing or a fast, fluttery heartbeat      Nausea or vomiting      Lightheadedness or a sudden cold sweat, especially with chest pain or trouble breathing    Return to the emergency department if:     You have severe pain, or pain that spreads from your arm to other areas.      You have swelling, tingling, or numbness in your hand or fingers, or the skin turns blue.      You cannot move your arm.    Contact your healthcare provider if:     You have questions or concerns about your condition or care.        Medicines: You may need any of the following:     Prescription pain medicine may be given. Ask how to take this medicine safely.      NSAIDs, such as ibuprofen, help decrease swelling, pain, and fever. This medicine is available with or without a doctor's order. NSAIDs can cause stomach bleeding or kidney problems in certain people. If you take blood thinner medicine, always ask your healthcare provider if NSAIDs are safe for you. Always read the medicine label and follow directions.      Take your medicine as directed. Contact your healthcare provider if you think your medicine is not helping or if you have side effects. Tell him or her if you are allergic to any medicine. Keep a list of the medicines, vitamins, and herbs you take. Include the amounts, and when and why you take them. Bring the list or the pill bottles to follow-up visits. Carry your medicine list with you in case of an emergency.    Self-care:     Rest your arm as directed. A sling may be used to keep your arm from moving while it heals.      Apply ice as directed. Ice helps decrease pain and swelling. Ice may also help prevent tissue damage. Use an ice pack, or put crushed ice in a plastic bag. Cover it with a towel. Apply it to your arm for 20 minutes every few hours, or as directed. Ask how many times to apply ice each day, and for how many days.      Elevate your arm above the level of your heart as often as you can. This will help decrease swelling and pain. Prop your arm on pillows or blankets to keep the area elevated comfortably.      Adjust your position if you work in front of a computer. You may need arm or wrist supports or change the height of your chair.       Keep a pain record. Write down when your pain happens and how severe it is. Include any other symptoms you have with your pain. A record will help you keep track of pain cycles. Bring the record with you to your follow-up visits. It may also help your healthcare provider find out what is causing your pain.    Follow up with your healthcare provider as directed: You may need physical therapy. You may need to see an orthopedic specialist. Write down your questions so you remember to ask them during your visits.       © Copyright [employer] 2019 All illustrations and images included in CareNotes are the copyrighted property of [employer].D.A[employer]M., Inc. or [employer].

## 2025-05-26 ENCOUNTER — EMERGENCY (EMERGENCY)
Facility: HOSPITAL | Age: 35
LOS: 0 days | Discharge: HOME | End: 2025-05-26
Attending: EMERGENCY MEDICINE
Payer: MEDICAID

## 2025-05-26 VITALS
DIASTOLIC BLOOD PRESSURE: 79 MMHG | RESPIRATION RATE: 19 BRPM | WEIGHT: 248.9 LBS | SYSTOLIC BLOOD PRESSURE: 123 MMHG | HEART RATE: 98 BPM | TEMPERATURE: 98 F | OXYGEN SATURATION: 99 %

## 2025-05-26 DIAGNOSIS — J45.901 UNSPECIFIED ASTHMA WITH (ACUTE) EXACERBATION: ICD-10-CM

## 2025-05-26 DIAGNOSIS — R09.81 NASAL CONGESTION: ICD-10-CM

## 2025-05-26 DIAGNOSIS — R06.02 SHORTNESS OF BREATH: ICD-10-CM

## 2025-05-26 DIAGNOSIS — R05.1 ACUTE COUGH: ICD-10-CM

## 2025-05-26 PROCEDURE — 99283 EMERGENCY DEPT VISIT LOW MDM: CPT

## 2025-05-26 RX ORDER — IPRATROPIUM BROMIDE AND ALBUTEROL SULFATE .5; 2.5 MG/3ML; MG/3ML
3 SOLUTION RESPIRATORY (INHALATION) ONCE
Refills: 0 | Status: DISCONTINUED | OUTPATIENT
Start: 2025-05-26 | End: 2025-05-26

## 2025-05-26 RX ORDER — BUDESONIDE AND FORMOTEROL FUMARATE DIHYDRATE 80; 4.5 UG/1; UG/1
2 AEROSOL RESPIRATORY (INHALATION)
Qty: 1 | Refills: 0
Start: 2025-05-26 | End: 2025-06-24

## 2025-05-26 NOTE — ED PROVIDER NOTE - NSFOLLOWUPINSTRUCTIONS_ED_ALL_ED_FT
Our Emergency Department Referral Coordinators will be reaching out to you in the next 24-48 hours from 9:00am to 5:00pm with a follow up appointment. Please expect a phone call from the hospital in that time frame. If you do not receive a call or if you have any questions or concerns, you can reach them at   [phone]    Asthma    Asthma is a condition in which the airways tighten and narrow, making it difficult to breath. Asthma episodes, also called asthma attacks, range from minor to life-threatening. Symptoms include wheezing, coughing, chest tightness, or shortness of breath. The diagnosis of asthma is made by a review of your medical history and a physical exam, but may involve additional testing. Asthma cannot be cured, but medicines and lifestyle changes can help control it. Avoid triggers of asthma which may include animal dander, pollen, mold, smoke, air pollutants, etc.     SEEK IMMEDIATE MEDICAL CARE IF YOU HAVE ANY OF THE FOLLOWING SYMPTOMS: worsening of symptoms, shortness of breath at rest, chest pain, bluish discoloration to lips or fingertips, lightheadedness/dizziness, or fever.

## 2025-05-26 NOTE — ED PROVIDER NOTE - PHYSICAL EXAMINATION
CONSTITUTIONAL: well-appearing, well nourished, non-toxic, NAD  HEAD: NCAT  EYES: EOMI, PERRLA, no scleral icterus  ENT: Moist mucous membranes, normal pharynx with no erythema or exudates  NECK: Full ROM.   CARD: RRR  RESP: clear to ausculation b/l, no respiratory distress, mild restriction of air b/l  EXT: Full ROM  NEURO: normal motor. normal sensory.   PSYCH: Cooperative, appropriate.

## 2025-05-26 NOTE — ED PROVIDER NOTE - CLINICAL SUMMARY MEDICAL DECISION MAKING FREE TEXT BOX
Patient with history of asthma presents for evaluation shortness of breath cough congestion wheezing.  Patient given 2 treatments by EMS and 12 mg of Decadron now reports wheezing has resolved and feels improved.  On exam patient no distress speaking full sentences S1-S2 CTAB soft abdomen no calf swelling.  Impression  patient with acute asthma exacerbation with reported wheezing improved with meds.  Patient stable at discharge.  Considered PE but patient is PERC negative and with reported history of wheezing and history of asthma with improvent in sx's after meds more likely etiology.

## 2025-05-26 NOTE — ED ADULT NURSE NOTE - OBJECTIVE STATEMENT
BIBA from home for asthma exacerbation. Hx of asthma. Received Decadron 12 mg and 2 duonebs from EMS PTA. Pt states feel relief denies SOB nor CP

## 2025-05-26 NOTE — ED PROVIDER NOTE - PATIENT PORTAL LINK FT
You can access the FollowMyHealth Patient Portal offered by [facility] by registering at the following website: [email or web address]. By joining [employer]’s FollowMyHealth portal, you will also be able to view your health information using other applications (apps) compatible with our system.

## 2025-05-26 NOTE — ED ADULT NURSE NOTE - HIV OFFER
Cardiology consult called to Dr. Tierney Ferreira via [employer]. Dr. Olga Cranker covering. Will await call back. Previously Declined (within the last year)

## 2025-05-26 NOTE — ED PROVIDER NOTE - OBJECTIVE STATEMENT
34-year-old female with history of asthma with 1 prior intubation in the past presents for evaluation of asthma exacerbation.  Patient was laying down when reading about when she suddenly started feeling warm.  Despite transorbital window, she felt increasingly short of breath and called the ambulance.  She was given 2 treatments of nebs and 1 treatment of IV 12 Mg Decadron with improvement of symptoms.  She denies any current shortness of breath, but has been having cough and congestion for the past couple of days which she attributes to allergies.  She endorses having worsening symptoms with weather changes.  Patient is looking to get a closer appointment with pulmonology.  She denies fevers, chills, sweats, chest pain, nausea, vomiting or abdominal pain.

## 2025-05-30 NOTE — CHART NOTE - NSCHARTNOTEFT_GEN_A_CORE
Emailed Pulmo 5/27- AC. Appt scheduled 06/24/2025 11:15 AM w/ Dr. Cadena @ [address] [place] [address] 5/30 (pulmonary referral).

## 2025-06-05 ENCOUNTER — APPOINTMENT (OUTPATIENT)
Dept: ORTHOPEDIC SURGERY | Facility: CLINIC | Age: 35
End: 2025-06-05

## 2025-06-20 ENCOUNTER — EMERGENCY (EMERGENCY)
Facility: HOSPITAL | Age: 35
LOS: 0 days | Discharge: HOME | End: 2025-06-20
Attending: EMERGENCY MEDICINE
Payer: MEDICAID

## 2025-06-20 VITALS
RESPIRATION RATE: 16 BRPM | TEMPERATURE: 98 F | HEART RATE: 100 BPM | DIASTOLIC BLOOD PRESSURE: 60 MMHG | OXYGEN SATURATION: 100 % | SYSTOLIC BLOOD PRESSURE: 100 MMHG | WEIGHT: 248.9 LBS

## 2025-06-20 PROCEDURE — 99283 EMERGENCY DEPT VISIT LOW MDM: CPT | Mod: 25

## 2025-06-20 PROCEDURE — 73030 X-RAY EXAM OF SHOULDER: CPT | Mod: 26,LT

## 2025-06-20 PROCEDURE — 73030 X-RAY EXAM OF SHOULDER: CPT | Mod: LT

## 2025-06-20 PROCEDURE — 99284 EMERGENCY DEPT VISIT MOD MDM: CPT

## 2025-06-20 RX ORDER — IBUPROFEN 200 MG
600 TABLET ORAL ONCE
Refills: 0 | Status: COMPLETED | OUTPATIENT
Start: 2025-06-20 | End: 2025-06-20

## 2025-06-20 RX ADMIN — Medication 600 MILLIGRAM(S): at 03:14

## 2025-06-20 NOTE — ED PROVIDER NOTE - NSICDXNOPASTSURGICALHX_GEN_ALL_ED
no abdominal pain/no diarrhea/no melena/no vomiting
<-- Click to add NO significant Past Surgical History

## 2025-06-20 NOTE — ED ADULT NURSE NOTE - NSFALLRISKINTERV_ED_ALL_ED

## 2025-06-20 NOTE — ED PROVIDER NOTE - NSFOLLOWUPINSTRUCTIONS_ED_ALL_ED_FT
Our Emergency Department Referral Coordinators will be reaching out to you in the next 24-48 hours from 9:00am to 5:00pm with a follow up appointment. Please expect a phone call from the hospital in that time frame. If you do not receive a call or if you have any questions or concerns, you can reach them at   [phone]    Shoulder Pain    WHAT YOU NEED TO KNOW:    Shoulder pain is a common problem that can affect your daily activities. Pain can be caused by a problem within your shoulder, such as soreness of a tendon or bursa. A tendon is a cord of tough tissue that connects your muscles to your bones. The bursa is a fluid-filled sac that acts as a cushion between a bone and a tendon. Shoulder pain may also be caused by pain that spreads to your shoulder from another part of your body.Shoulder Anatomy         DISCHARGE INSTRUCTIONS:    Return to the emergency department if:     You have severe pain.      You cannot move your arm or shoulder.      You have numbness or tingling in your shoulder or arm.    Contact your healthcare provider if:     Your pain gets worse or does not go away with treatment.      You have trouble moving your arm or shoulder.      You have questions or concerns about your condition or care.    Medicines: You may need any of the following:     Acetaminophen decreases pain and fever. It is available without a doctor's order. Ask how much to take and how often to take it. Follow directions. Read the labels of all other medicines you are using to see if they also contain acetaminophen, or ask your doctor or pharmacist. Acetaminophen can cause liver damage if not taken correctly. Do not use more than 4 grams (4,000 milligrams) total of acetaminophen in one day.       NSAIDs, such as ibuprofen, help decrease swelling, pain, and fever. This medicine is available with or without a doctor's order. NSAIDs can cause stomach bleeding or kidney problems in certain people. If you take blood thinner medicine, always ask your healthcare provider if NSAIDs are safe for you. Always read the medicine label and follow directions.      Take your medicine as directed. Contact your healthcare provider if you think your medicine is not helping or if you have side effects. Tell him of her if you are allergic to any medicine. Keep a list of the medicines, vitamins, and herbs you take. Include the amounts, and when and why you take them. Bring the list or the pill bottles to follow-up visits. Carry your medicine list with you in case of an emergency.    Manage your symptoms:     Apply ice on your shoulder for 20 to 30 minutes every 2 hours or as directed. Use an ice pack, or put crushed ice in a plastic bag. Cover it with a towel before you apply it to your shoulder. Ice helps prevent tissue damage and decreases swelling and pain.      Apply heat if ice does not help your symptoms. Apply heat on your shoulder for 20 to 30 minutes every 2 hours for as many days as directed. Heat helps decrease pain and muscle spasms.      Limit activities as directed. Try to avoid repeated overhead movements.      Go to physical or occupational therapy as directed. A physical therapist teaches you exercises to help improve movement and strength, and to decrease pain. An occupational therapist teaches you skills to help with your daily activities.     Prevent shoulder pain:     Maintain a good range of motion in your shoulder. Ask your healthcare provider which exercises you should do on a regular basis after you have healed.       Stretch and strengthen your shoulder. Use proper technique during exercises and sports.    Follow up with your healthcare provider or orthopedist as directed: Write down your questions so you remember to ask them during your visits.        © Copyright [employer] 2019 All illustrations and images included in CareNotes are the copyrighted property of [employer]., Inc[employer] or [employer].

## 2025-06-20 NOTE — ED PROVIDER NOTE - PHYSICAL EXAMINATION
Vital Signs: I have reviewed the initial vital signs.  Constitutional: well-nourished, appears stated age, no acute distress  Musculoskeletal: left shoulder: symmetrical no ac dropoff; Ir/er; flex/ext intact; pulses and sensation intact;   Heent; Ncat; no facial tenderness; no neck pain;   lungs: clear to aus; no rib tenderness;

## 2025-06-20 NOTE — ED PROVIDER NOTE - PATIENT PORTAL LINK FT
You can access the FollowMyHealth Patient Portal offered by [facility] by registering at the following website: [email or web address]. By joining [employer]’s FollowMyHealth portal, you will also be able to view your health information using other applications (apps) compatible with our system.

## 2025-06-20 NOTE — ED PROVIDER NOTE - OBJECTIVE STATEMENT
35 yold female to Ed Pmhx athma, pDm s/p accidental fall yesterday c/o left shoulder pain with movement; denies head injury, neck, chest, back or abdominal pain; pt took ibuprofen with mild relief

## 2025-06-20 NOTE — ED PROVIDER NOTE - NS ED ROS FT
Constitutional: (-) fever  Cardiovascular: (-) syncope  Integumentary: (-) rash  Musculoskeltal:  left shoulder pain s/p fall  Neurological: (-) altered mental status

## 2025-06-22 ENCOUNTER — EMERGENCY (EMERGENCY)
Facility: HOSPITAL | Age: 35
LOS: 0 days | Discharge: HOME | End: 2025-06-22
Attending: STUDENT IN AN ORGANIZED HEALTH CARE EDUCATION/TRAINING PROGRAM
Payer: MEDICAID

## 2025-06-22 VITALS
TEMPERATURE: 98 F | HEART RATE: 104 BPM | OXYGEN SATURATION: 99 % | DIASTOLIC BLOOD PRESSURE: 72 MMHG | RESPIRATION RATE: 19 BRPM | SYSTOLIC BLOOD PRESSURE: 108 MMHG
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
## 2025-06-22 DIAGNOSIS — M25.522 PAIN IN LEFT ELBOW: ICD-10-CM
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
## 2025-06-22 DIAGNOSIS — J45.909 UNSPECIFIED ASTHMA, UNCOMPLICATED: ICD-10-CM

## 2025-06-22 DIAGNOSIS — E11.9 TYPE 2 DIABETES MELLITUS WITHOUT COMPLICATIONS: ICD-10-CM

## 2025-06-22 PROCEDURE — 99284 EMERGENCY DEPT VISIT MOD MDM: CPT

## 2025-06-22 PROCEDURE — 99283 EMERGENCY DEPT VISIT LOW MDM: CPT | Mod: 25

## 2025-06-22 PROCEDURE — 73080 X-RAY EXAM OF ELBOW: CPT | Mod: LT

## 2025-06-22 PROCEDURE — 73080 X-RAY EXAM OF ELBOW: CPT | Mod: 26,LT

## 2025-06-22 RX ORDER — IBUPROFEN 200 MG
600 TABLET ORAL ONCE
Refills: 0 | Status: COMPLETED | OUTPATIENT
Start: 2025-06-22 | End: 2025-06-22

## 2025-06-22 RX ADMIN — Medication 600 MILLIGRAM(S): at 09:40

## 2025-06-22 NOTE — ED PROVIDER NOTE - CLINICAL SUMMARY MEDICAL DECISION MAKING FREE TEXT BOX
.    34 y/o F pmh as noted, p/w L elbow pain s/p mech fall 4d ago. Seen in ED for fall, had shoulder xray, discahrged, then had elbow pain later. No fever, cp, sob, weakness, or numbness.    Exam as note above, pt NAD, LUE NL inspection, + ttp at L olecranon and medial elbow, NL ROM, n/v intact distally.    All available lab tests, imaging tests, and EKGs independently reviewed and interpreted by me.  see results    IMP: elbow pain.  Pt stable for dc w/ outpt f/up, and care as discussed.  Pt understands plan and signs and symptoms for ED return. DC home.     .

## 2025-06-22 NOTE — ED ADULT TRIAGE NOTE - MEANS OF ARRIVAL
pt. awake, agitated, removed his gown and get dressed trying to get off his stretcher. MD Armijo aware, Haldol and Ativan were given. Constant observation started as per MD for pt's safety. pt. hooked on cardiac monitor and 1L of NC for now. changed in gown. blanket provided. will continue to provide nsg care ambulatory

## 2025-06-22 NOTE — ED PROVIDER NOTE - NSFOLLOWUPINSTRUCTIONS_ED_ALL_ED_FT
Our Emergency Department Referral Coordinators will be reaching out to you in the next 24-48 hours from 9:00am to 5:00pm with a follow up appointment. Please expect a phone call from the hospital in that time frame. If you do not receive a call or if you have any questions or concerns, you can reach them at [phone].    Elbow Sprain    WHAT YOU NEED TO KNOW:    An elbow sprain is caused by a stretched or torn ligament in the elbow joint. Ligaments are the strong tissues that connect bones.    DISCHARGE INSTRUCTIONS:    Return to the emergency department if:     The skin of your injured arm looks bluish or pale (less color than normal).      You have new or increased numbness in your injured arm.    Contact your healthcare provider if:     You have increased swelling and pain in your elbow.      You have new or increased stiffness or trouble moving your injured arm.      You have questions or concerns about your condition or care.    Medicines:     Prescription pain medicine may be given. Ask how to take this medicine safely.      Take your medicine as directed. Contact your healthcare provider if you think your medicine is not helping or if you have side effects. Tell him or her if you are allergic to any medicine. Keep a list of the medicines, vitamins, and herbs you take. Include the amounts, and when and why you take them. Bring the list or the pill bottles to follow-up visits. Carry your medicine list with you in case of an emergency.    Rest your elbow: You will need to rest your elbow for 1 to 2 days after your injury. This will help decrease the risk of more damage to your elbow.    Ice your elbow: Apply ice on your elbow for 15 to 20 minutes every hour or as directed. Use an ice pack, or put crushed ice in a plastic bag. Cover it with a towel. Ice helps prevent tissue damage and decreases swelling and pain.    Compress your elbow: Compression provides support and helps decrease swelling and movement so your elbow can heal. You may be told to keep your elbow wrapped with a tight elastic bandage. Follow instructions about how to apply your bandage.    Elevate your elbow: Elevate your elbow above the level of your heart as often as you can. This will help decrease swelling and pain. Prop your elbow on pillows or blankets to keep it elevated comfortably.     Exercise your elbow: You should begin to exercise your arm in a few days, once you are able to move your elbow without pain. Exercises will help decrease stiffness and improve the strength of your arm. Ask your healthcare provider what kind of exercises you should do.    Prevent another elbow sprain:     Make sure you warm up and stretch before you exercise.      Do not exercise when you feel pain or you are tired.      Wear equipment to protect yourself when you play sports.      Stop exercising and playing sports if your symptoms from a past injury return.    Follow up with your healthcare provider within 1 week: Write down any questions you have so you remember to ask them in your follow-up visits.

## 2025-06-22 NOTE — ED PROVIDER NOTE - TEMPLATE, MLM
General Tazorac Counseling:  Patient advised that medication is irritating and drying.  Patient may need to apply sparingly and wash off after an hour before eventually leaving it on overnight.  The patient verbalized understanding of the proper use and possible adverse effects of tazorac.  All of the patient's questions and concerns were addressed.

## 2025-06-22 NOTE — ED ADULT NURSE NOTE - COMMUNICABLE DISEASE SCREEN: LAST 2 WEEKS
Chief Complaint   Patient presents with   • Well Child     Patient is here for 9 month check up       Fidencio Vaca is a 9 m.o. male  who is brought in for this well child visit.    History was provided by the parents.    The following portions of the patient's history were reviewed and updated as appropriate: allergies, current medications, past family history, past medical history, past social history, past surgical history and problem list.  Current Outpatient Medications   Medication Sig Dispense Refill   • albuterol (ACCUNEB) 0.63 MG/3ML nebulizer solution Take 3 mL by nebulization Every 4 (Four) Hours As Needed for Wheezing or Shortness of Air. 60 each 2   • ibuprofen (ADVIL,MOTRIN) 100 MG/5ML suspension Take 3.6 mL by mouth Every 6 (Six) Hours As Needed for Mild Pain. 118 mL 1     No current facility-administered medications for this visit.       No Known Allergies    Current Issues:  Current concerns include none.    Review of Nutrition:  Current diet: formula, baby food, table foods  Current feeding pattern: 2 baby food and dinner  Difficulties with feeding? no    Social Screening:  Current child-care arrangements: in home: primary caregiver is mother  Sibling relations: brothers: Jeet  Secondhand Smoke Exposure? no  Car Seat (backwards, back seat) yes  Smoke Detectors  yes    Developmental History:  Says mama and crow nonspecifically:  yes  Plays peek-a-stewart and pat-a-cake:  yes  Looks for an object out of view: yes  Exhibits stranger anxiety:  yes  Able to do a pincer grasp:  yes  Sits without support:  yes  Can get into a sitting position: yes  Crawls: yes  Pulls up to standing: yes  Cruises or walks: yes    Review of Systems   All other systems reviewed and are negative.             Physical Exam:    Ht 67.6 cm (26.63\")   Wt 8947 g (19 lb 11.6 oz)   HC 46 cm (18.13\")   BMI 19.56 kg/m²     Physical Exam  Constitutional:       General: He has a strong cry.      Appearance: He is  well-developed.   HENT:      Head: Mass (mobile, palpable soft lesion to right possterior skull, not increased in size from last visit) present. Anterior fontanelle is flat.      Right Ear: Tympanic membrane normal.      Left Ear: Tympanic membrane normal.      Nose: Nose normal.      Mouth/Throat:      Mouth: Mucous membranes are moist.      Pharynx: Oropharynx is clear.   Eyes:      General: Red reflex is present bilaterally.      Pupils: Pupils are equal, round, and reactive to light.   Cardiovascular:      Rate and Rhythm: Normal rate and regular rhythm.   Pulmonary:      Effort: Pulmonary effort is normal.      Breath sounds: Normal breath sounds.   Abdominal:      General: Bowel sounds are normal. There is no distension.      Palpations: Abdomen is soft.      Tenderness: There is no abdominal tenderness.   Musculoskeletal:         General: Normal range of motion.      Cervical back: Neck supple.   Skin:     General: Skin is warm and dry.      Turgor: Normal.   Neurological:      Mental Status: He is alert.      Primitive Reflexes: Suck normal.         Healthy 9 m.o. well baby.    1. Anticipatory guidance discussed. Gave handout on well-child issues at this age.    If your baby wakes up at night, wait a few minutes to give them some time to settle down. If fussiness continues, offer reassurance that you're there, but try not to , play with, or feed your baby. Separation anxiety often starts around 9 months. Continue to keep your baby in a rear-facing car seat until your child reaches the weight or height limit set by the car-seat . Avoid sun exposure by keeping your baby covered and in the shade when possible. You may use sunscreen (SPF 30) if shade and clothing don't offer enough protection. Brush your child's teeth with a soft toothbrush and a tiny bit of toothpaste (about the size of a grain of rice) twice a day. Keep up with childproofing. Keep emergency numbers, including the Poison Help  Line at 1-988.497.4758, near the phone. To prevent drowning, close bathroom doors, keep toilet seats down, and always supervise around water (including baths). Sing, talk, play, and read to your baby. TV viewing (or other screen time, including computers) is not recommended for babies this young. Video chatting is OK. Protect your child fromsecondhand smoke, which increases the risk of heart and lung disease. Protect your child from gun injuries by not keeping a gun in the home. If you do have a gun, keep it unloaded and locked away. Lock up ammunition separately.     2. Development: appropriate for age    3.  Immunizations: discussed risk/benefits to vaccination, reviewed components of the vaccine, discussed VIS, discussed informed consent and informed consent obtained. Patient was allowed to accept or refuse vaccine. Questions answered to satisfactory state of patient. We reviewed typical age appropriate and seasonally appropriate vaccinations. Reviewed immunization history and updated state vaccination form as needed    Assessment & Plan     Diagnoses and all orders for this visit:    1. Encounter for well child visit at 9 months of age (Primary)    2. Hemangioma of subcutaneous tissue on scalp        Return in about 3 months (around 4/6/2023) for Annual physical.                  none

## 2025-06-22 NOTE — ED PROVIDER NOTE - PATIENT PORTAL LINK FT
You can access the FollowMyHealth Patient Portal offered by  by registering at the following website: [email or web address]. By joining [employer]’s FollowMyHealth portal, you will also be able to view your health information using other applications (apps) compatible with our system.

## 2025-06-22 NOTE — ED PROVIDER NOTE - OBJECTIVE STATEMENT
35-year-old female with history of asthma, diabetes presents to the ED complaining of fall on Thursday.  Patient was seen in ED for shoulder pain and now complaining of left elbow pain.  No numbness, tingling or weakness.

## 2025-06-23 PROBLEM — J45.909 UNSPECIFIED ASTHMA, UNCOMPLICATED: Chronic | Status: INACTIVE | Noted: 2022-11-30 | Resolved: 2025-06-22

## 2025-06-26 ENCOUNTER — APPOINTMENT (OUTPATIENT)
Dept: ORTHOPEDIC SURGERY | Facility: CLINIC | Age: 35
End: 2025-06-26
Payer: MEDICAID

## 2025-06-26 PROBLEM — S59.902A INJURY OF LEFT ELBOW, INITIAL ENCOUNTER: Status: ACTIVE | Noted: 2025-06-26

## 2025-06-26 PROCEDURE — 99213 OFFICE O/P EST LOW 20 MIN: CPT | Mod: 25

## 2025-06-26 PROCEDURE — 73080 X-RAY EXAM OF ELBOW: CPT | Mod: LT

## 2025-07-14 ENCOUNTER — APPOINTMENT (OUTPATIENT)
Dept: ORTHOPEDIC SURGERY | Facility: CLINIC | Age: 35
End: 2025-07-14

## 2025-07-19 ENCOUNTER — EMERGENCY (EMERGENCY)
Facility: HOSPITAL | Age: 35
LOS: 0 days | Discharge: HOME | End: 2025-07-19
Attending: STUDENT IN AN ORGANIZED HEALTH CARE EDUCATION/TRAINING PROGRAM
Payer: MEDICAID

## 2025-07-19 VITALS
RESPIRATION RATE: 16 BRPM | DIASTOLIC BLOOD PRESSURE: 58 MMHG | OXYGEN SATURATION: 95 % | HEART RATE: 98 BPM | SYSTOLIC BLOOD PRESSURE: 115 MMHG

## 2025-07-19 VITALS
DIASTOLIC BLOOD PRESSURE: 60 MMHG | SYSTOLIC BLOOD PRESSURE: 129 MMHG | TEMPERATURE: 97 F | RESPIRATION RATE: 18 BRPM | HEART RATE: 117 BPM | OXYGEN SATURATION: 97 %

## 2025-07-19 DIAGNOSIS — R06.2 WHEEZING: ICD-10-CM

## 2025-07-19 DIAGNOSIS — R06.82 TACHYPNEA, NOT ELSEWHERE CLASSIFIED: ICD-10-CM

## 2025-07-19 DIAGNOSIS — J45.901 UNSPECIFIED ASTHMA WITH (ACUTE) EXACERBATION: ICD-10-CM

## 2025-07-19 DIAGNOSIS — Z87.09 PERSONAL HISTORY OF OTHER DISEASES OF THE RESPIRATORY SYSTEM: ICD-10-CM

## 2025-07-19 LAB
ALBUMIN SERPL ELPH-MCNC: 4.3 G/DL — SIGNIFICANT CHANGE UP (ref 3.5–5.2)
ALP SERPL-CCNC: 73 U/L — SIGNIFICANT CHANGE UP (ref 30–115)
ALT FLD-CCNC: 16 U/L — SIGNIFICANT CHANGE UP (ref 0–41)
ANION GAP SERPL CALC-SCNC: 13 MMOL/L — SIGNIFICANT CHANGE UP (ref 7–14)
AST SERPL-CCNC: 47 U/L — HIGH (ref 0–41)
BASOPHILS # BLD AUTO: 0.09 K/UL — SIGNIFICANT CHANGE UP (ref 0–0.2)
BASOPHILS NFR BLD AUTO: 0.6 % — SIGNIFICANT CHANGE UP (ref 0–1)
BILIRUB SERPL-MCNC: 0.6 MG/DL — SIGNIFICANT CHANGE UP (ref 0.2–1.2)
BLOOD GAS SOURCE: SIGNIFICANT CHANGE UP
BUN SERPL-MCNC: 11 MG/DL — SIGNIFICANT CHANGE UP (ref 10–20)
CALCIUM SERPL-MCNC: 8.7 MG/DL — SIGNIFICANT CHANGE UP (ref 8.4–10.5)
CHLORIDE SERPL-SCNC: 100 MMOL/L — SIGNIFICANT CHANGE UP (ref 98–110)
CO2 SERPL-SCNC: 21 MMOL/L — SIGNIFICANT CHANGE UP (ref 17–32)
COHGB MFR BLDV: 1.9 % — SIGNIFICANT CHANGE UP
CREAT SERPL-MCNC: 0.9 MG/DL — SIGNIFICANT CHANGE UP (ref 0.7–1.5)
EGFR: 87 ML/MIN/1.73M2 — SIGNIFICANT CHANGE UP
EGFR: 87 ML/MIN/1.73M2 — SIGNIFICANT CHANGE UP
EOSINOPHIL # BLD AUTO: 1.15 K/UL — HIGH (ref 0–0.7)
EOSINOPHIL NFR BLD AUTO: 7.5 % — SIGNIFICANT CHANGE UP (ref 0–8)
GAS PNL BLDV: SIGNIFICANT CHANGE UP
GLUCOSE SERPL-MCNC: 135 MG/DL — HIGH (ref 70–99)
HCG SERPL QL: NEGATIVE — SIGNIFICANT CHANGE UP
HCT VFR BLD CALC: 41.3 % — SIGNIFICANT CHANGE UP (ref 37–47)
HGB BLD CALC-MCNC: 14.6 G/DL — SIGNIFICANT CHANGE UP (ref 11.7–16.1)
HGB BLD-MCNC: 13.9 G/DL — SIGNIFICANT CHANGE UP (ref 12–16)
IMM GRANULOCYTES NFR BLD AUTO: 0.4 % — HIGH (ref 0.1–0.3)
LYMPHOCYTES # BLD AUTO: 31.2 % — SIGNIFICANT CHANGE UP (ref 20.5–51.1)
LYMPHOCYTES # BLD AUTO: 4.76 K/UL — HIGH (ref 1.2–3.4)
MCHC RBC-ENTMCNC: 30.1 PG — SIGNIFICANT CHANGE UP (ref 27–31)
MCHC RBC-ENTMCNC: 33.7 G/DL — SIGNIFICANT CHANGE UP (ref 32–37)
MCV RBC AUTO: 89.4 FL — SIGNIFICANT CHANGE UP (ref 81–99)
MONOCYTES # BLD AUTO: 0.94 K/UL — HIGH (ref 0.1–0.6)
MONOCYTES NFR BLD AUTO: 6.2 % — SIGNIFICANT CHANGE UP (ref 1.7–9.3)
NEUTROPHILS # BLD AUTO: 8.25 K/UL — HIGH (ref 1.4–6.5)
NEUTROPHILS NFR BLD AUTO: 54.1 % — SIGNIFICANT CHANGE UP (ref 42.2–75.2)
NRBC BLD AUTO-RTO: 0 /100 WBCS — SIGNIFICANT CHANGE UP (ref 0–0)
PLATELET # BLD AUTO: 411 K/UL — HIGH (ref 130–400)
PMV BLD: 10.8 FL — HIGH (ref 7.4–10.4)
POTASSIUM SERPL-MCNC: SIGNIFICANT CHANGE UP MMOL/L (ref 3.5–5)
POTASSIUM SERPL-SCNC: SIGNIFICANT CHANGE UP MMOL/L (ref 3.5–5)
PROT SERPL-MCNC: 7.4 G/DL — SIGNIFICANT CHANGE UP (ref 6–8)
RBC # BLD: 4.62 M/UL — SIGNIFICANT CHANGE UP (ref 4.2–5.4)
RBC # FLD: 12.8 % — SIGNIFICANT CHANGE UP (ref 11.5–14.5)
SODIUM SERPL-SCNC: 134 MMOL/L — LOW (ref 135–146)
WBC # BLD: 15.25 K/UL — HIGH (ref 4.8–10.8)
WBC # FLD AUTO: 15.25 K/UL — HIGH (ref 4.8–10.8)

## 2025-07-19 PROCEDURE — 85025 COMPLETE CBC W/AUTO DIFF WBC: CPT

## 2025-07-19 PROCEDURE — 84703 CHORIONIC GONADOTROPIN ASSAY: CPT

## 2025-07-19 PROCEDURE — 80053 COMPREHEN METABOLIC PANEL: CPT

## 2025-07-19 PROCEDURE — 36000 PLACE NEEDLE IN VEIN: CPT

## 2025-07-19 PROCEDURE — 82803 BLOOD GASES ANY COMBINATION: CPT

## 2025-07-19 PROCEDURE — 82330 ASSAY OF CALCIUM: CPT

## 2025-07-19 PROCEDURE — 82375 ASSAY CARBOXYHB QUANT: CPT

## 2025-07-19 PROCEDURE — 36415 COLL VENOUS BLD VENIPUNCTURE: CPT

## 2025-07-19 PROCEDURE — 71046 X-RAY EXAM CHEST 2 VIEWS: CPT | Mod: 26

## 2025-07-19 PROCEDURE — 84295 ASSAY OF SERUM SODIUM: CPT

## 2025-07-19 PROCEDURE — 71046 X-RAY EXAM CHEST 2 VIEWS: CPT

## 2025-07-19 PROCEDURE — 83605 ASSAY OF LACTIC ACID: CPT

## 2025-07-19 PROCEDURE — 85014 HEMATOCRIT: CPT

## 2025-07-19 PROCEDURE — 85018 HEMOGLOBIN: CPT

## 2025-07-19 PROCEDURE — 84132 ASSAY OF SERUM POTASSIUM: CPT

## 2025-07-19 PROCEDURE — 99291 CRITICAL CARE FIRST HOUR: CPT

## 2025-07-19 PROCEDURE — 99283 EMERGENCY DEPT VISIT LOW MDM: CPT | Mod: 25

## 2025-07-19 RX ORDER — PREDNISONE 20 MG/1
1 TABLET ORAL
Refills: 0
Start: 2025-07-19

## 2025-07-19 RX ORDER — PREDNISONE 20 MG/1
2 TABLET ORAL
Qty: 10 | Refills: 0
Start: 2025-07-19 | End: 2025-07-23

## 2025-07-19 RX ADMIN — Medication 1000 MILLILITER(S): at 14:15

## 2025-09-05 ENCOUNTER — APPOINTMENT (OUTPATIENT)
Dept: PULMONOLOGY | Facility: CLINIC | Age: 35
End: 2025-09-05
Payer: MEDICAID

## 2025-09-05 VITALS
WEIGHT: 248 LBS | HEART RATE: 86 BPM | RESPIRATION RATE: 14 BRPM | DIASTOLIC BLOOD PRESSURE: 62 MMHG | OXYGEN SATURATION: 98 % | SYSTOLIC BLOOD PRESSURE: 106 MMHG | BODY MASS INDEX: 38.84 KG/M2

## 2025-09-05 DIAGNOSIS — J45.30 MILD PERSISTENT ASTHMA, UNCOMPLICATED: ICD-10-CM

## 2025-09-05 DIAGNOSIS — J30.9 ALLERGIC RHINITIS, UNSPECIFIED: ICD-10-CM

## 2025-09-05 DIAGNOSIS — G47.33 OBSTRUCTIVE SLEEP APNEA (ADULT) (PEDIATRIC): ICD-10-CM

## 2025-09-05 PROCEDURE — 99214 OFFICE O/P EST MOD 30 MIN: CPT

## 2025-09-05 PROCEDURE — G2211 COMPLEX E/M VISIT ADD ON: CPT | Mod: NC

## 2025-09-05 RX ORDER — FLUTICASONE FUROATE, UMECLIDINIUM BROMIDE AND VILANTEROL TRIFENATATE 100; 62.5; 25 UG/1; UG/1; UG/1
100-62.5-25 POWDER RESPIRATORY (INHALATION)
Qty: 1 | Refills: 5 | Status: ACTIVE | COMMUNITY
Start: 2025-09-05 | End: 1900-01-01